# Patient Record
Sex: FEMALE | Race: WHITE | Employment: OTHER | URBAN - METROPOLITAN AREA
[De-identification: names, ages, dates, MRNs, and addresses within clinical notes are randomized per-mention and may not be internally consistent; named-entity substitution may affect disease eponyms.]

---

## 2017-03-29 ENCOUNTER — RECORDS - HEALTHEAST (OUTPATIENT)
Dept: LAB | Facility: CLINIC | Age: 72
End: 2017-03-29

## 2017-03-30 LAB
CHOLEST SERPL-MCNC: 98 MG/DL
FASTING STATUS PATIENT QL REPORTED: ABNORMAL
HBA1C MFR BLD: 6.2 % (ref 4.2–6.1)
HDLC SERPL-MCNC: 30 MG/DL
LDLC SERPL CALC-MCNC: 50 MG/DL
TRIGL SERPL-MCNC: 91 MG/DL

## 2017-04-19 DIAGNOSIS — I25.10 CAD (CORONARY ARTERY DISEASE): ICD-10-CM

## 2017-04-19 DIAGNOSIS — E78.5 HYPERLIPIDEMIA LDL GOAL <70: ICD-10-CM

## 2017-04-19 DIAGNOSIS — E78.2 MIXED HYPERLIPIDEMIA: Primary | ICD-10-CM

## 2017-04-19 RX ORDER — ATORVASTATIN CALCIUM 40 MG/1
40 TABLET, FILM COATED ORAL DAILY
Qty: 90 TABLET | Refills: 1 | Status: SHIPPED | OUTPATIENT
Start: 2017-04-19 | End: 2018-04-09

## 2017-10-11 ENCOUNTER — TRANSFERRED RECORDS (OUTPATIENT)
Dept: HEALTH INFORMATION MANAGEMENT | Facility: CLINIC | Age: 72
End: 2017-10-11

## 2017-10-11 LAB
ALBUMIN SERPL-MCNC: 3.7 G/DL (ref 3.4–5)
ALP SERPL-CCNC: 47 U/L (ref 0–116)
ALT SERPL-CCNC: 23 U/L (ref 0–78)
AST SERPL-CCNC: 21 U/L (ref 0–37)
BILIRUB SERPL-MCNC: 0.31 MG/DL (ref 0.2–1)
BUN SERPL-MCNC: 15 MG/DL (ref 7–18)
CALCIUM SERPL-MCNC: 9.1 MG/DL (ref 8.5–10.1)
CHLORIDE SERPLBLD-SCNC: 106 MMOL/L (ref 98–107)
CHOL/HDL RATIO - HISTORICAL: 3
CHOLEST SERPL-MCNC: 146 MG/DL (ref 0–200)
CREAT SERPL-MCNC: 0.92 MG/DL (ref 0.6–1.3)
GLOBULIN: 3.4 G/DL (ref 2–4)
GLUCOSE SERPL-MCNC: 118 MG/DL (ref 70–99)
HDLC SERPL-MCNC: 49 MG/DL (ref 40–96)
LDLC SERPL CALC-MCNC: 79.8 MG/DL (ref 0–130)
POTASSIUM SERPL-SCNC: 3.8 MMOL/L (ref 3.5–5.1)
SODIUM SERPL-SCNC: 143 MMOL/L (ref 136–145)
TOTAL PROTEIN - QUEST: 7.1 (ref 6.4–8.2)
TRIGL SERPL-MCNC: 86 MG/DL (ref 30–200)
VLDL CHOLESTEROL: 17.2 MG/DL (ref 5–40)

## 2017-11-22 ENCOUNTER — TRANSFERRED RECORDS (OUTPATIENT)
Dept: HEALTH INFORMATION MANAGEMENT | Facility: CLINIC | Age: 72
End: 2017-11-22

## 2018-01-12 ENCOUNTER — DOCUMENTATION ONLY (OUTPATIENT)
Dept: CARDIOLOGY | Facility: CLINIC | Age: 73
End: 2018-01-12

## 2018-04-09 ENCOUNTER — OFFICE VISIT (OUTPATIENT)
Dept: CARDIOLOGY | Facility: CLINIC | Age: 73
End: 2018-04-09
Payer: COMMERCIAL

## 2018-04-09 VITALS
HEART RATE: 66 BPM | SYSTOLIC BLOOD PRESSURE: 110 MMHG | BODY MASS INDEX: 20.66 KG/M2 | WEIGHT: 124 LBS | OXYGEN SATURATION: 97 % | DIASTOLIC BLOOD PRESSURE: 60 MMHG | HEIGHT: 65 IN

## 2018-04-09 DIAGNOSIS — I10 BENIGN ESSENTIAL HYPERTENSION: ICD-10-CM

## 2018-04-09 DIAGNOSIS — Z98.61 S/P PTCA (PERCUTANEOUS TRANSLUMINAL CORONARY ANGIOPLASTY): ICD-10-CM

## 2018-04-09 DIAGNOSIS — I25.10 CORONARY ARTERY DISEASE INVOLVING NATIVE CORONARY ARTERY OF NATIVE HEART WITHOUT ANGINA PECTORIS: Primary | ICD-10-CM

## 2018-04-09 DIAGNOSIS — E78.2 MIXED HYPERLIPIDEMIA: ICD-10-CM

## 2018-04-09 PROCEDURE — 99214 OFFICE O/P EST MOD 30 MIN: CPT | Performed by: INTERNAL MEDICINE

## 2018-04-09 RX ORDER — LOSARTAN POTASSIUM 50 MG/1
50 TABLET ORAL DAILY
COMMUNITY
End: 2018-06-17

## 2018-04-09 RX ORDER — PROPRANOLOL HYDROCHLORIDE 20 MG/1
20 TABLET ORAL 3 TIMES DAILY
COMMUNITY
End: 2018-06-17

## 2018-04-09 RX ORDER — ARIPIPRAZOLE 5 MG/1
5 TABLET ORAL DAILY
COMMUNITY

## 2018-04-09 NOTE — PROGRESS NOTES
HPI and Plan:   See dictation:[id number]    Orders Placed This Encounter   Procedures     Follow-Up with Cardiologist       Orders Placed This Encounter   Medications     ARIPiprazole (ABILIFY) 5 MG tablet     Sig: Take 5 mg by mouth daily     CYANOCOBALAMIN PO     losartan (COZAAR) 50 MG tablet     Sig: Take 50 mg by mouth daily     MIRTAZAPINE PO     Sig: Take 15 mg by mouth At Bedtime     NYSTATIN EX     POLYETHYLENE GLYCOL 3350 PO     propranolol (INDERAL) 20 MG tablet     Sig: Take 20 mg by mouth 3 times daily       Medications Discontinued During This Encounter   Medication Reason     losartan-hydrochlorothiazide (HYZAAR) 100-25 MG per tablet Dose adjustment     atorvastatin (LIPITOR) 40 MG tablet Therapy completed     carboxymethylcellulose (REFRESH PLUS) 0.5 % SOLN Therapy completed     cholecalciferol 2000 UNITS tablet Therapy completed     clonazePAM (KLONOPIN) 0.25 MG TABS Therapy completed     cyclobenzaprine (FLEXERIL) 5 MG tablet Therapy completed     fenofibrate (TRICOR) 145 MG tablet Therapy completed     metoprolol (TOPROL-XL) 50 MG 24 hr tablet Therapy completed     QUEtiapine Fumarate (SEROQUEL PO) Therapy completed     QUEtiapine Fumarate (SEROQUEL PO) Therapy completed     clopidogrel (PLAVIX) 75 MG tablet          Encounter Diagnoses   Name Primary?     Coronary artery disease involving native coronary artery of native heart without angina pectoris Yes     S/P PTCA (percutaneous transluminal coronary angioplasty)      Benign essential hypertension      Mixed hyperlipidemia        CURRENT MEDICATIONS:  Current Outpatient Prescriptions   Medication Sig Dispense Refill     ARIPiprazole (ABILIFY) 5 MG tablet Take 5 mg by mouth daily       CYANOCOBALAMIN PO        losartan (COZAAR) 50 MG tablet Take 50 mg by mouth daily       MIRTAZAPINE PO Take 15 mg by mouth At Bedtime       NYSTATIN EX        POLYETHYLENE GLYCOL 3350 PO        propranolol (INDERAL) 20 MG tablet Take 20 mg by mouth 3 times daily        amLODIPine (NORVASC) 5 MG tablet Take 2 tablets (10 mg) by mouth every evening 90 tablet 3     loratadine-pseudoePHEDrine (CLARITIN-D 12-HOUR) 5-120 MG per tablet Take 1 tablet by mouth 2 times daily as needed for allergies       GLIPIZIDE XL PO Take 5 mg by mouth every morning        DULoxetine (CYMBALTA) 60 MG capsule Take 60 mg by mouth every evening at 1700       OMEPRAZOLE PO Take 20 mg by mouth every morning        fluticasone (FLONASE) 50 MCG/ACT nasal spray Spray 2 sprays into both nostrils daily       Calcium Carbonate-Vitamin D (CALCIUM + D) 600-200 MG-UNIT per tablet Take 1 tablet by mouth daily        metformin (GLUCOPHAGE) 1000 MG tablet Take 1,000 mg by mouth 2 times daily (with meals).       levothyroxine (SYNTHROID, LEVOTHROID) 88 MCG tablet Take 88 mcg by mouth every morning (before breakfast)        aspirin 325 MG tablet Take 81 mg by mouth daily PT STOPPED       Multiple Vitamin (DAILY MULTIVITAMIN PO) Take 1 tablet by mouth daily At noon         ALLERGIES     Allergies   Allergen Reactions     Sulfa Drugs      Synthroid [Thyroid Hormones]        PAST MEDICAL HISTORY:  Past Medical History:   Diagnosis Date     Coronary artery disease 1/1999    MI, CABG-LIMA to the LAD, saphenous vein graft to the diagonal, saphenous vein graft to obtuse  marginal 1, and a separate saphenous vein graft to obtuse marginal 3     DM type 2, goal A1C 7-8     quite a long time     HTN (hypertension)      Hypercholesteremia      Hyperlipidemia LDL goal <100      Hypothyroid     takes levothyroxin for quite a few years     LOC (loss of consciousness) (H) 1960    briefly- went through [facility] and had stitches to left top of head     Mild major depression (H)      S/P PTCA (percutaneous transluminal coronary angioplasty) 9/2/14    stent first diagonal     Vitamin B 12 deficiency        PAST SURGICAL HISTORY:  Past Surgical History:   Procedure Laterality Date     ANKLE SURGERY       APPENDECTOMY       BYPASS GRAFT  ARTERY CORONARY      MI, CABG-LIMA to the LAD, saphenous vein graft to the diagonal, saphenous vein graft to obtuse  marginal 1, and a separate saphenous vein graft to obtuse marginal 3     CHOLECYSTECTOMY       COLONOSCOPY       EYE SURGERY      cataracts- both eyes and implants     HEART CATH, ANGIOPLASTY  2/20/15     patent blood flow in all vessels including grafts.     ORTHOPEDIC SURGERY      left ankle     SINUS SURGERY         FAMILY HISTORY:  Family History   Problem Relation Age of Onset     DIABETES       Depression       DIABETES       C.A.D.       Depression Mother      in and out of IP, mom killed self when she was 15     Anxiety Disorder Mother      Depression Maternal Grandmother      Suicide Maternal Grandmother 40     attempted suicide,  laterIP- possibly from drinking lye     Depression Sister      Anxiety Disorder Sister      Bipolar Disorder Sister      Substance Abuse Sister      Depression Sister      Anxiety Disorder Sister        SOCIAL HISTORY:  Social History     Social History     Marital status:      Spouse name: N/A     Number of children: N/A     Years of education: N/A     Social History Main Topics     Smoking status: Former Smoker     Packs/day: 1.50     Years: 30.00     Types: Cigarettes     Quit date: 3/4/1992     Smokeless tobacco: Never Used     Alcohol use 0.6 oz/week     1 Standard drinks or equivalent per week      Comment: Rare drink with dinner, now doesn't     Drug use: No     Sexual activity: Not Currently     Partners: Male     Other Topics Concern     Caffeine Concern No     1 - 2 daily     Sleep Concern Yes     takes Seroquil     Special Diet Yes      low fat      Exercise Yes     Wel program 4 days per week     Social History Narrative       Review of Systems:  Skin:  Positive for bruising     Eyes:  Positive for glasses    ENT:  not assessed      Respiratory:  Negative       Cardiovascular:    Positive for;palpitations    Gastroenterology: not assessed   "    Genitourinary:  not assessed      Musculoskeletal:  not assessed      Neurologic:  not assessed      Psychiatric:         Heme/Lymph/Imm:  not assessed      Endocrine:  not assessed        Physical Exam:  Vitals: /60 (BP Location: Right arm, Patient Position: Sitting, Cuff Size: Adult Regular)  Pulse 66  Ht 1.651 m (5' 5\")  Wt 56.2 kg (124 lb)  SpO2 97%  BMI 20.63 kg/m2    Constitutional:  cooperative thin;cachectic;frail      Skin:  warm and dry to the touch          Head:    facial abrasions and bruising      Eyes:  pupils equal and round;sclera white;no xanthalasma;conjunctivae and lids unremarkable        Lymph:      ENT:  no pallor or cyanosis        Neck:  carotid pulses are full and equal bilaterally, JVP normal, no carotid bruit        Respiratory:  healed median sternotomy scar;clear to auscultation;normal symmetry;normal respiratory excursion         Cardiac: regular rhythm;normal S1 and S2;apical impulse not displaced   S4   systolic ejection murmur;grade 1;LLSB;radiation to the RUSB        pulses full and equal                                        GI:  abdomen soft, non-tender, BS normoactive, no mass, no HSM, no bruits        Extremities and Muscular Skeletal:  no edema;no deformities, clubbing, cyanosis, erythema observed              Neurological:    tremor clicks her dentures repeatedly, walks with her walker taking very small steps, affect is blunted, answers to questions one word.    Psych:           CC  Nate Guzman MD  [address]  [place] [address]              "

## 2018-04-09 NOTE — LETTER
4/9/2018      Barbara Manzanares  [facility] [address]  [facility] [address]      RE: Caitlyn Brower       Dear Colleague,    I had the pleasure of seeing Caitlyn Brower in the [facility].    Service Date: 04/09/2018      PRIMARY CARE PHYSICIAN:  Dr. Barbara Manzanares.      HISTORY OF PRESENT ILLNESS:  I again had the pleasure of seeing your patient, Caitlyn Brower, at [facility] for evaluation of coronary artery disease and mixed hyperlipidemia.  The patient is status post 4-vessel coronary artery bypass surgery in 01/1999 after suffering a first diagonal branch artery myocardial infarction.  Her bypasses included left internal mammary artery to the LAD, saphenous vein graft to the diagonal with a skip graft to the first obtuse marginal and a separate saphenous vein graft to obtuse marginal 3.  The patient developed unstable angina on 09/02/2014 and was taken the [facility] for intervention.  She had multivessel disease with the LIMA to the LAD patent.  Saphenous vein graft to the diagonal 1 had a 95% stenosis at its anastomotic site.  There was a jump graft to obtuse marginal 1 that was patent.  Obtuse marginal 3 saphenous vein graft was occluded with contrast staining at the graft anastomosis but no antegrade flow.  This vessel was not intervened upon.  The diagonal branch artery anastomosis graft was then treated with a drug-eluting stent.  The only area of possible ischemia would be the third obtuse marginal branch artery and this was treated medically.  The patient had more chest discomfort in 02/2015 and coronary angiography showed no change in her anatomy.  She has been admitted for chest discomfort in 01/2016 and the dobutamine stress echo was normal.  The patient has Lewy body dementia and for the most part lives in an assisted living home.  She has a history of anxiety and nervousness and has  gone to the [facility] in the past for ECT treatments.  Her weight continues to fall and she has now lost close to 60 pounds in the last 2 years.  Her  notes that she eats with a voracious appetite.  Her last fasting lipid profile in 10/2017 showed total cholesterol 146, HDL 49, LDL 80 and triglycerides 86.  The patient has diabetes mellitus.  She has had 2 falls last week causing abrasions to her face, nose, chin and ecchymoses around both eyes.   These were not observed and it is not clear whether she fell or had a syncopal episode.  Her blood pressure when evaluated on 04/06 was 166/80.      PHYSICAL EXAMINATION:   VITAL SIGNS:  Current blood pressure 110/60, pulse 66 and regular, weight 124 pounds compared to 167 pounds in 11/2016.  BMI is now 21.   CHEST:  Clear to auscultation.   CARDIAC:  Regular rate and rhythm, normal S1 and S2 with an S4 gallop but no S3.  There is a 1/6 systolic ejection murmur left lower sternal border, the right upper sternal border.  No other murmur.  No JVD.  Pulses were all intact without bruits.   ABDOMEN:  Benign.   EXTREMITIES:  Without edema.   HEENT:  Demonstrates ecchymoses around her eyes and abrasions on her face.      ASSESSMENT:   1.  Caitlyn Brower is a 72-year-old female with known coronary artery disease status post previous diagonal branch artery myocardial infarction and 4-vessel bypass surgery in 1999.  In 09/2014 she developed unstable angina and the anastomosis of the saphenous vein graft to diagonal 1 was stented with excellent results.  Dobutamine stress echocardiogram was normal in 01/2016.  I have taken the liberty of discontinuing her clopidogrel given her recent falls.  She will continue on aspirin 81 mg per day.   2.  Mixed hyperlipidemia currently under reasonably good control.  The patient was previously on fenofibrate and a statin.  She does not appear to be on either of those at this time.  It is unclear to me why these were discontinued.  I would  certainly like her to remain on the previous statin if possible.   3.  Her blood pressure is quite labile.  It is unclear how well it is controlled.  I certainly do not want her falling due to hypotension.  With her recent weight loss it is possible she is overmedicated.  I would ask Dr. Manzanares to follow her blood pressure and if need be reduce or stop her amlodipine.  Hydrochlorothiazide and clopidogrel have been discontinued.     It is my pleasure to assist in the care of Caitlyn Brower.  All her questions were answered to her satisfaction.    I will see this patient again in 6 months or earlier on a p.r.n. basis.  All her 's questions were answered to his satisfaction.      Bruno Gibson MD       cc:   [place] [address]         BRUNO GIBSON MD, [facility]             D: 2018   T: 2018   MT: RACHEL      Name:     CAITLYN BROWER   MRN:      [id number]        Account:      [id number]   :      1945           Service Date: 2018      Document: [id number]           Outpatient Encounter Prescriptions as of 2018   Medication Sig Dispense Refill     ARIPiprazole (ABILIFY) 5 MG tablet Take 5 mg by mouth daily       CYANOCOBALAMIN PO        losartan (COZAAR) 50 MG tablet Take 50 mg by mouth daily       MIRTAZAPINE PO Take 15 mg by mouth At Bedtime       NYSTATIN EX        POLYETHYLENE GLYCOL 3350 PO        propranolol (INDERAL) 20 MG tablet Take 20 mg by mouth 3 times daily       amLODIPine (NORVASC) 5 MG tablet Take 2 tablets (10 mg) by mouth every evening 90 tablet 3     loratadine-pseudoePHEDrine (CLARITIN-D 12-HOUR) 5-120 MG per tablet Take 1 tablet by mouth 2 times daily as needed for allergies       GLIPIZIDE XL PO Take 5 mg by mouth every morning        DULoxetine (CYMBALTA) 60 MG capsule Take 60 mg by mouth every evening at 1700       OMEPRAZOLE PO Take 20 mg by mouth every morning        fluticasone  (FLONASE) 50 MCG/ACT nasal spray Spray 2 sprays into both nostrils daily       Calcium Carbonate-Vitamin D (CALCIUM + D) 600-200 MG-UNIT per tablet Take 1 tablet by mouth daily        metformin (GLUCOPHAGE) 1000 MG tablet Take 1,000 mg by mouth 2 times daily (with meals).       levothyroxine (SYNTHROID, LEVOTHROID) 88 MCG tablet Take 88 mcg by mouth every morning (before breakfast)        aspirin 325 MG tablet Take 81 mg by mouth daily PT STOPPED       Multiple Vitamin (DAILY MULTIVITAMIN PO) Take 1 tablet by mouth daily At noon       [DISCONTINUED] atorvastatin (LIPITOR) 40 MG tablet Take 1 tablet (40 mg) by mouth daily 90 tablet 1     [DISCONTINUED] clopidogrel (PLAVIX) 75 MG tablet Take 1 tablet (75 mg) by mouth daily 90 tablet 3     [DISCONTINUED] clonazePAM (KLONOPIN) 0.25 MG TABS Take 0.25 mg by mouth 2 times daily every morning & at bedtime       [DISCONTINUED] cyclobenzaprine (FLEXERIL) 5 MG tablet Take 5 mg by mouth nightly as needed for muscle spasms       [DISCONTINUED] QUEtiapine Fumarate (SEROQUEL PO) Take 200 mg by mouth At Bedtime       [DISCONTINUED] carboxymethylcellulose (REFRESH PLUS) 0.5 % SOLN Place 1 drop into both eyes 2 times daily as needed for dry eyes       [DISCONTINUED] losartan-hydrochlorothiazide (HYZAAR) 100-25 MG per tablet Take 1 tablet by mouth daily 30 tablet 1     [DISCONTINUED] QUEtiapine Fumarate (SEROQUEL PO) Take 50 mg by mouth 2 times daily In the morning and at noon. See other bedtime dose       [DISCONTINUED] cholecalciferol 2000 UNITS tablet Take 2,000 Units by mouth daily 30 tablet 0     [DISCONTINUED] metoprolol (TOPROL-XL) 50 MG 24 hr tablet Take 50 mg by mouth daily.       [DISCONTINUED] fenofibrate (TRICOR) 145 MG tablet Take 145 mg by mouth daily.       No facility-administered encounter medications on file as of 4/9/2018.        Again, thank you for allowing me to participate in the care of your patient.      Sincerely,    Nate Guzman MD     [facility]  [facility]

## 2018-04-09 NOTE — LETTER
4/9/2018    [facility] [address]  [facility] [address]    RE: Caitlyn Brower       Dear Colleague,    I had the pleasure of seeing Caitlyn Brower in the [facility].    HPI and Plan:   See dictation:[id number]    Orders Placed This Encounter   Procedures     Follow-Up with Cardiologist       Orders Placed This Encounter   Medications     ARIPiprazole (ABILIFY) 5 MG tablet     Sig: Take 5 mg by mouth daily     CYANOCOBALAMIN PO     losartan (COZAAR) 50 MG tablet     Sig: Take 50 mg by mouth daily     MIRTAZAPINE PO     Sig: Take 15 mg by mouth At Bedtime     NYSTATIN EX     POLYETHYLENE GLYCOL 3350 PO     propranolol (INDERAL) 20 MG tablet     Sig: Take 20 mg by mouth 3 times daily       Medications Discontinued During This Encounter   Medication Reason     losartan-hydrochlorothiazide (HYZAAR) 100-25 MG per tablet Dose adjustment     atorvastatin (LIPITOR) 40 MG tablet Therapy completed     carboxymethylcellulose (REFRESH PLUS) 0.5 % SOLN Therapy completed     cholecalciferol 2000 UNITS tablet Therapy completed     clonazePAM (KLONOPIN) 0.25 MG TABS Therapy completed     cyclobenzaprine (FLEXERIL) 5 MG tablet Therapy completed     fenofibrate (TRICOR) 145 MG tablet Therapy completed     metoprolol (TOPROL-XL) 50 MG 24 hr tablet Therapy completed     QUEtiapine Fumarate (SEROQUEL PO) Therapy completed     QUEtiapine Fumarate (SEROQUEL PO) Therapy completed     clopidogrel (PLAVIX) 75 MG tablet          Encounter Diagnoses   Name Primary?     Coronary artery disease involving native coronary artery of native heart without angina pectoris Yes     S/P PTCA (percutaneous transluminal coronary angioplasty)      Benign essential hypertension      Mixed hyperlipidemia        CURRENT MEDICATIONS:  Current Outpatient Prescriptions   Medication Sig Dispense Refill     ARIPiprazole (ABILIFY) 5 MG tablet Take 5 mg by mouth daily       CYANOCOBALAMIN PO         losartan (COZAAR) 50 MG tablet Take 50 mg by mouth daily       MIRTAZAPINE PO Take 15 mg by mouth At Bedtime       NYSTATIN EX        POLYETHYLENE GLYCOL 3350 PO        propranolol (INDERAL) 20 MG tablet Take 20 mg by mouth 3 times daily       amLODIPine (NORVASC) 5 MG tablet Take 2 tablets (10 mg) by mouth every evening 90 tablet 3     loratadine-pseudoePHEDrine (CLARITIN-D 12-HOUR) 5-120 MG per tablet Take 1 tablet by mouth 2 times daily as needed for allergies       GLIPIZIDE XL PO Take 5 mg by mouth every morning        DULoxetine (CYMBALTA) 60 MG capsule Take 60 mg by mouth every evening at 1700       OMEPRAZOLE PO Take 20 mg by mouth every morning        fluticasone (FLONASE) 50 MCG/ACT nasal spray Spray 2 sprays into both nostrils daily       Calcium Carbonate-Vitamin D (CALCIUM + D) 600-200 MG-UNIT per tablet Take 1 tablet by mouth daily        metformin (GLUCOPHAGE) 1000 MG tablet Take 1,000 mg by mouth 2 times daily (with meals).       levothyroxine (SYNTHROID, LEVOTHROID) 88 MCG tablet Take 88 mcg by mouth every morning (before breakfast)        aspirin 325 MG tablet Take 81 mg by mouth daily PT STOPPED       Multiple Vitamin (DAILY MULTIVITAMIN PO) Take 1 tablet by mouth daily At noon         ALLERGIES     Allergies   Allergen Reactions     Sulfa Drugs      Synthroid [Thyroid Hormones]        PAST MEDICAL HISTORY:  Past Medical History:   Diagnosis Date     Coronary artery disease 1/1999    MI, CABG-LIMA to the LAD, saphenous vein graft to the diagonal, saphenous vein graft to obtuse  marginal 1, and a separate saphenous vein graft to obtuse marginal 3     DM type 2, goal A1C 7-8     quite a long time     HTN (hypertension)      Hypercholesteremia      Hyperlipidemia LDL goal <100      Hypothyroid     takes levothyroxin for quite a few years     LOC (loss of consciousness) (H) 1960    briefly[facility] and had [facility] to left top of head     Mild major depression (H)      S/P PTCA  (percutaneous transluminal coronary angioplasty) 14    stent first diagonal     Vitamin B 12 deficiency        PAST SURGICAL HISTORY:  Past Surgical History:   Procedure Laterality Date     ANKLE SURGERY       APPENDECTOMY       BYPASS GRAFT ARTERY CORONARY      MI, CABG-LIMA to the LAD, saphenous vein graft to the diagonal, saphenous vein graft to obtuse  marginal 1, and a separate saphenous vein graft to obtuse marginal 3     CHOLECYSTECTOMY       COLONOSCOPY       EYE SURGERY      cataracts- both eyes and implants     HEART CATH, ANGIOPLASTY  2/20/15     patent blood flow in all vessels including grafts.     ORTHOPEDIC SURGERY      left ankle     SINUS SURGERY         FAMILY HISTORY:  Family History   Problem Relation Age of Onset     DIABETES       Depression       DIABETES       C.A.D.       Depression Mother      in and out of IP, mom killed self when she was 16     Anxiety Disorder Mother      Depression Maternal Grandmother      Suicide Maternal Grandmother 40     attempted suicide,  laterIP- possibly from drinking lye     Depression Sister      Anxiety Disorder Sister      Bipolar Disorder Sister      Substance Abuse Sister      Depression Sister      Anxiety Disorder Sister        SOCIAL HISTORY:  Social History     Social History     Marital status:      Spouse name: N/A     Number of children: N/A     Years of education: N/A     Social History Main Topics     Smoking status: Former Smoker     Packs/day: 1.50     Years: 30.00     Types: Cigarettes     Quit date: 3/4/1992     Smokeless tobacco: Never Used     Alcohol use 0.6 oz/week     1 Standard drinks or equivalent per week      Comment: Rare drink with dinner, now doesn't     Drug use: No     Sexual activity: Not Currently     Partners: Male     Other Topics Concern     Caffeine Concern No     1 - 2 daily     Sleep Concern Yes     takes Seroquil     Special Diet Yes      low fat      Exercise Yes     Wel program 4 days per week  "    Social History Narrative       Review of Systems:  Skin:  Positive for bruising     Eyes:  Positive for glasses    ENT:  not assessed      Respiratory:  Negative       Cardiovascular:    Positive for;palpitations    Gastroenterology: not assessed      Genitourinary:  not assessed      Musculoskeletal:  not assessed      Neurologic:  not assessed      Psychiatric:         Heme/Lymph/Imm:  not assessed      Endocrine:  not assessed        Physical Exam:  Vitals: /60 (BP Location: Right arm, Patient Position: Sitting, Cuff Size: Adult Regular)  Pulse 66  Ht 1.651 m (5' 5\")  Wt 56.2 kg (124 lb)  SpO2 97%  BMI 20.63 kg/m2    Constitutional:  cooperative thin;cachectic;frail      Skin:  warm and dry to the touch          Head:    facial abrasions and bruising      Eyes:  pupils equal and round;sclera white;no xanthalasma;conjunctivae and lids unremarkable        Lymph:      ENT:  no pallor or cyanosis        Neck:  carotid pulses are full and equal bilaterally, JVP normal, no carotid bruit        Respiratory:  healed median sternotomy scar;clear to auscultation;normal symmetry;normal respiratory excursion         Cardiac: regular rhythm;normal S1 and S2;apical impulse not displaced   S4   systolic ejection murmur;grade 1;LLSB;radiation to the RUSB        pulses full and equal                                        GI:  abdomen soft, non-tender, BS normoactive, no mass, no HSM, no bruits        Extremities and Muscular Skeletal:  no edema;no deformities, clubbing, cyanosis, erythema observed              Neurological:    tremor clicks her dentures repeatedly, walks with her walker taking very small steps, affect is blunted, answers to questions one word.    Psych:           CC  Nate Guzman MD  [address] [place] [id number]                Thank you for allowing me to participate in the care of your patient.      Sincerely,     Nate Guzman MD     [facility]    cc:   Nate Guzman MD  [address]  [place] [address]

## 2018-04-09 NOTE — MR AVS SNAPSHOT
"              After Visit Summary   2018    Caitlyn Brower    MRN: [id number]           Patient Information     Date Of Birth          1945        Visit Information        Provider Department      2018 2:45 PM Nate Guzman MD [facility]         Follow-ups after your visit        Who to contact     If you have questions or need follow up information about today's clinic visit or your schedule please contact [facility] directly at [phone].  Normal or non-critical lab and imaging results will be communicated to you by MyChart, letter or phone within 4 business days after the clinic has received the results. If you do not hear from us within 7 days, please contact the clinic through [employer] or phone. If you have a critical or abnormal lab result, we will notify you by phone as soon as possible.  Submit refill requests through [employer] or call your pharmacy and they will forward the refill request to us. Please allow 3 business days for your refill to be completed.          Additional Information About Your Visit        MyChart Information     [employer] lets you send messages to your doctor, view your test results, renew your prescriptions, schedule appointments and more. To sign up, go to [email or web address] . Click on \"Log in\" on the left side of the screen, which will take you to the Welcome page. Then click on \"Sign up Now\" on the right side of the page.     You will be asked to enter the access code listed below, as well as some personal information. Please follow the directions to create your username and password.     Your access code is: [email or web address]  Expires: 2018  3:20 PM     Your access code will  in 90 days. If you need help or a new code, please call your [place] clinic or [phone].        Care EveryWhere ID     This is your Care EveryWhere ID. This could be used by other " "organizations to access your [place] medical records  [id number]        Your Vitals Were     Pulse Height Pulse Oximetry BMI (Body Mass Index)          66 1.651 m (5' 5\") 97% 20.63 kg/m2         Blood Pressure from Last 3 Encounters:   04/09/18 110/60   12/13/16 156/66   11/15/16 190/90    Weight from Last 3 Encounters:   04/09/18 56.2 kg (124 lb)   12/13/16 75 kg (165 lb 6.4 oz)   11/15/16 75.8 kg (167 lb)              Today, you had the following     No orders found for display         Today's Medication Changes          These changes are accurate as of 4/9/18  3:20 PM.  If you have any questions, ask your nurse or doctor.               Stop taking these medicines if you haven't already. Please contact your care team if you have questions.     clopidogrel 75 MG tablet   Commonly known as:  PLAVIX   Stopped by:  Nate Guzman MD           losartan-hydrochlorothiazide 100-25 MG per tablet   Commonly known as:  HYZAAR   Stopped by:  Nate Guzman MD                    Primary Care Provider Office Phone # Fax #    Barbara Mindy Manzanares [phone]       [facility] [address] [facility] [address]        Equal Access to Services     ETTA LEI AH: Hadii austyn acosta hadasho Soomaali, waaxda luqadaha, qaybta kaalmada adeegyada, waxay anithain hayseeman ashwini ashford. So [facility] [phone].    ATENCIÓN: Si habla español, tiene a ocampo disposición servicios gratuitos de asistencia lingüística. Llame al [phone].    We comply with applicable federal civil rights laws and [place] laws. We do not discriminate on the basis of race, color, national origin, age, disability, sex, sexual orientation, or gender identity.            Thank you!     Thank you for choosing [facility]  for your care. Our goal is always to provide you with excellent care. Hearing back from our patients is one way we can continue to improve our services. Please " take a few minutes to complete the written survey that you may receive in the mail after your visit with us. Thank you!             Your Updated Medication List - Protect others around you: Learn how to safely use, store and throw away your medicines at www.disposemymeds.org.          This list is accurate as of 4/9/18  3:20 PM.  Always use your most recent med list.                   Brand Name Dispense Instructions for use Diagnosis    amLODIPine 5 MG tablet    NORVASC    90 tablet    Take 2 tablets (10 mg) by mouth every evening    Benign essential hypertension       ARIPiprazole 5 MG tablet    ABILIFY     Take 5 mg by mouth daily        aspirin 325 MG tablet      Take 81 mg by mouth daily PT STOPPED        calcium + D 600-200 MG-UNIT Tabs   Generic drug:  calcium carbonate-vitamin D      Take 1 tablet by mouth daily        CYANOCOBALAMIN PO           CYMBALTA 60 MG EC capsule   Generic drug:  DULoxetine      Take 60 mg by mouth every evening at 1700        DAILY MULTIVITAMIN PO      Take 1 tablet by mouth daily At noon        fluticasone 50 MCG/ACT spray    FLONASE     Spray 2 sprays into both nostrils daily        GLIPIZIDE XL PO      Take 5 mg by mouth every morning        levothyroxine 88 MCG tablet    SYNTHROID/LEVOTHROID     Take 88 mcg by mouth every morning (before breakfast)        loratadine-pseudoePHEDrine 5-120 MG per 12 hr tablet    CLARITIN-D 12-hour     Take 1 tablet by mouth 2 times daily as needed for allergies        losartan 50 MG tablet    COZAAR     Take 50 mg by mouth daily        metFORMIN 1000 MG tablet    GLUCOPHAGE     Take 1,000 mg by mouth 2 times daily (with meals).        MIRTAZAPINE PO      Take 15 mg by mouth At Bedtime        NYSTATIN EX           OMEPRAZOLE PO      Take 20 mg by mouth every morning        POLYETHYLENE GLYCOL 3350 PO           propranolol 20 MG tablet    INDERAL     Take 20 mg by mouth 3 times daily

## 2018-04-10 NOTE — PROGRESS NOTES
Service Date: 04/09/2018      PRIMARY CARE PHYSICIAN:  Dr. Barbara Manzanares.      HISTORY OF PRESENT ILLNESS:  I again had the pleasure of seeing your patient, Caitlyn Brower, at [facility] for evaluation of coronary artery disease and mixed hyperlipidemia.  The patient is status post 4-vessel coronary artery bypass surgery in 01/1999 after suffering a first diagonal branch artery myocardial infarction.  Her bypasses included left internal mammary artery to the LAD, saphenous vein graft to the diagonal with a skip graft to the first obtuse marginal and a separate saphenous vein graft to obtuse marginal 3.  The patient developed unstable angina on 09/02/2014 and was taken the [facility] [place] for intervention.  She had multivessel disease with the LIMA to the LAD patent.  Saphenous vein graft to the diagonal 1 had a 95% stenosis at its anastomotic site.  There was a jump graft to obtuse marginal 1 that was patent.  Obtuse marginal 3 saphenous vein graft was occluded with contrast staining at the graft anastomosis but no antegrade flow.  This vessel was not intervened upon.  The diagonal branch artery anastomosis graft was then treated with a drug-eluting stent.  The only area of possible ischemia would be the third obtuse marginal branch artery and this was treated medically.  The patient had more chest discomfort in 02/2015 and coronary angiography showed no change in her anatomy.  She has been admitted for chest discomfort in 01/2016 and the dobutamine stress echo was normal.  The patient has Lewy body dementia and for the most part lives in an assisted living home.  She has a history of anxiety and nervousness and has gone to the [facility] in the past for ECT treatments.  Her weight continues to fall and she has now lost close to 60 pounds in the last 2 years.  Her  notes that she eats with a voracious appetite.  Her last fasting lipid profile in  10/2017 showed total cholesterol 146, HDL 49, LDL 80 and triglycerides 86.  The patient has diabetes mellitus.  She has had 2 falls last week causing abrasions to her face, nose, chin and ecchymoses around both eyes.  These were not observed and it is not clear whether she fell or had a syncopal episode.  Her blood pressure when evaluated on 04/06 was 166/80.      PHYSICAL EXAMINATION:   VITAL SIGNS:  Current blood pressure 110/60, pulse 66 and regular, weight 124 pounds compared to 167 pounds in 11/2016.  BMI is now 21.   CHEST:  Clear to auscultation.   CARDIAC:  Regular rate and rhythm, normal S1 and S2 with an S4 gallop but no S3.  There is a 1/6 systolic ejection murmur left lower sternal border, the right upper sternal border.  No other murmur.  No JVD.  Pulses were all intact without bruits.   ABDOMEN:  Benign.   EXTREMITIES:  Without edema.   HEENT:  Demonstrates ecchymoses around her eyes and abrasions on her face.      ASSESSMENT:   1.  Caitlyn Brower is a 72-year-old female with known coronary artery disease status post previous diagonal branch artery myocardial infarction and 4-vessel bypass surgery in 1999.  In 09/2014 she developed unstable angina and the anastomosis of the saphenous vein graft to diagonal 1 was stented with excellent results.  Dobutamine stress echocardiogram was normal in 01/2016.  I have taken the liberty of discontinuing her clopidogrel given her recent falls.  She will continue on aspirin 81 mg per day.   2.  Mixed hyperlipidemia currently under reasonably good control.  The patient was previously on fenofibrate and a statin.  She does not appear to be on either of those at this time.  It is unclear to me why these were discontinued.  I would certainly like her to remain on the previous statin if possible.   3.  Her blood pressure is quite labile.  It is unclear how well it is controlled.  I certainly do not want her falling due to hypotension.  With her recent weight loss it is  possible she is overmedicated.  I would ask Dr. Manzanares to follow her blood pressure and if need be reduce or stop her amlodipine.  Hydrochlorothiazide and clopidogrel have been discontinued.     It is my pleasure to assist in the care of Caitlyn Brower.  All her questions were answered to her satisfaction.    I will see this patient again in 6 months or earlier on a p.r.n. basis.  All her 's questions were answered to his satisfaction.      Bruno Gibson MD       cc:   [place] [address]         BRUNO GIBSON MD, FACC             D: 2018   T: 2018   MT: RACHEL      Name:     CAITLYN BROWER   MRN:      [id number]        Account:      [id number]   :      1945           Service Date: 2018      Document: [id number]

## 2018-05-19 ENCOUNTER — APPOINTMENT (OUTPATIENT)
Dept: GENERAL RADIOLOGY | Facility: CLINIC | Age: 73
End: 2018-05-19
Attending: NURSE PRACTITIONER
Payer: MEDICARE

## 2018-05-19 ENCOUNTER — HOSPITAL ENCOUNTER (EMERGENCY)
Facility: CLINIC | Age: 73
Discharge: NURSING, REHAB OR LONG-TERM CARE | End: 2018-05-19
Attending: EMERGENCY MEDICINE | Admitting: EMERGENCY MEDICINE
Payer: MEDICARE

## 2018-05-19 VITALS
TEMPERATURE: 98.1 F | RESPIRATION RATE: 16 BRPM | OXYGEN SATURATION: 100 % | HEART RATE: 65 BPM | SYSTOLIC BLOOD PRESSURE: 191 MMHG | DIASTOLIC BLOOD PRESSURE: 117 MMHG
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
## 2018-05-19 PROCEDURE — 99284 EMERGENCY DEPT VISIT MOD MDM: CPT

## 2018-05-19 PROCEDURE — 93005 ELECTROCARDIOGRAM TRACING: CPT

## 2018-05-19 PROCEDURE — 73502 X-RAY EXAM HIP UNI 2-3 VIEWS: CPT

## 2018-05-19 ASSESSMENT — ENCOUNTER SYMPTOMS
FEVER: 0
COUGH: 0
WOUND: 0
SHORTNESS OF BREATH: 0
NECK PAIN: 0
NAUSEA: 0
VOMITING: 0
CHILLS: 0
DIARRHEA: 0

## 2018-05-19 NOTE — ED NOTES
Bed: ED20  Expected date: 5/19/18  Expected time:   Means of arrival: Ambulance  Comments:  [facility]

## 2018-05-19 NOTE — ED AVS SNAPSHOT
[facility] Emergency Department    [address]    [facility] [address]    Phone:  [phone]    Fax:  [phone]                                       Caitlyn Brower   MRN: [id number]    Department:  [facility] Emergency Department   Date of Visit:  5/19/2018           Patient Information     Date Of Birth          1945        Your diagnoses for this visit were:     Fall, initial encounter        You were seen by Niels Barnett MD.      Follow-up Information     Follow up with Barbara Manzanares In 3 days.    Specialty:  Family Practice    Why:  As needed    Contact information:    [facility]  [address]  [facility] [address]  [phone]          Follow up with [facility] Emergency Department.    Specialty:  EMERGENCY MEDICINE    Why:  If symptoms worsen    Contact information:    [address]  [facility] [id number] [phone]        Discharge Instructions       Continue with physical therapy.  Follow-up with PCP Monday for a recheck as needed.  Return to ED with further falls or injuries.        Preventing Falls: Are You At Risk of Falling?     Ask for help to reduce risk of falling in your home.     As you get older, you're not as steady on your feet as you once were. And you may have health problems you didn't have when you were younger. So, it's not surprising that older people are more likely to trip and fall. Falling can be very serious. It can change your overall health and quality of life. That's why it's important to be aware of your own risk of falling.  The dangers of falling  Falls are one of the main causes of injury in people over age 65. An older person who falls may take longer to get better than a younger person. And, after a fall, an older person is more likely to have problems that don't go away. So, preventing falls can help you avoid serious health problems.  Are you at risk of  "falling?  Answer these questions to rate your level of risk.    Are you a woman?    Have you fallen or stumbled in the last year?    Are you over age 65?    Are you ever dizzy or lightheaded with standing?    Do you have a hard time getting in and out of the bathtub or on and off the toilet?    Do you lean on objects to help you get around? Or do you use a cane or walker?    Do you have vision or hearing problems? For example, do you need new glasses or hearing aids?    Do you have 2 or more long-lasting (chronic) medical conditions?    Do you take 3 or more medicines?    Have you felt depressed recently?    Have you had more trouble with your memory in recent months?    Are there hazards in your home that might cause you to fall, such as loose rugs or poor lighting?    Do you have a pet that jumps on you or might trip you?    Have you stopped getting regular exercise?    Do you have diabetes?     Do you have a neurologic disease, such as Parkinson or Alzheimer disease?     Do you drink alcohol?    Do you wear athletic shoes or slippers, or go barefoot at home?  You can help prevent falls  If you answered \"yes\" to any of the above questions, take steps to reduce your risk of a fall. Monitoring health conditions and keeping walkways in your home free of clutter are just two ways. Changing is sometimes easier said than done. But keep in mind that even small changes can make you less likely to fall.  The fear of falling  It's normal to be scared of falling, especially if you've fallen before. But being afraid can actually make you more likely to fall. This is because:    Fear might cause you to become less active. Being less active can lead to a loss of strength and balance.    Fear can lead to isolation from others, depression, or the use of more medicines or alcohol. And all these things make falling even more likely.  To break the cycle, learn more about ways to avoid falling. As you take control, you may find " "yourself feeling less afraid.   Date Last Reviewed: 5/1/2017 2000-2017 [employer]. [address] [place] [address], [place] [address]. All rights reserved. This information is not intended as a substitute for professional medical care. Always follow your healthcare professional's instructions.          Exercises to Prevent Falls  Certain types of exercises may help make you less likely to fall. Try the ones below. Or do other exercises that your healthcare provider suggests. Depending on your health, you may need to start slowly. Don't let that stop you. Even small amounts of exercise can help you. Be sure to talk to your healthcare provider before starting any exercise program.       Improve balance  Many types of exercise can help improve balance. Sam chi and yoga are good examples. Here's another one to try. You can do it anytime and almost anywhere.    Stand next to a counter or solid support.    Push yourself up onto your tiptoes.    Hold for 5 seconds. If you start to lose your balance, hold on to the counter.    Rest and repeat 5 times. Work up to holding for 20 to 30 seconds, if you can. Increase flexibility  Being more flexible makes it easier for you to move around safely. Try exercises like the seated hamstring stretch.    Sit in a chair and put one foot on a stool.    Straighten your leg and reach with both hands down either side of your leg. Reach as far down your leg as you can.    Hold for about 20 seconds.    Go back to the starting position. Then repeat 5 times. Switch legs. Build strength  \"Resistance\" exercises help build strength. You can do them without equipment. Or you can use weights, elastic bands, or special machines. One such exercise is called the biceps curl. You can hold a 1-pound weight or even a can of soup. Do this exercise at least 3 times a week. Strive for every day.    Sit up straight in a chair.    Keep your elbow close to your body and your wrist straight.    Bend your " arm, moving your hand up to your shoulder. Then slowly lower your arm.    Repeat 5 times. Switch to the other arm.   Build your staying power  Aerobic exercises make your heart and lungs stronger so you can keep moving longer. Walking and swimming are two of the best types of exercises you can do. Using a stationary bike is great, too. Find an aerobic exercise that you enjoy. Start slowly and build up. Even 5 minutes is helpful. Aim for a goal of 30 minutes, at least 3 times a week. You don't have to do 30 minutes in 1 session. Break it up and walk a little throughout the day.  More helpful tips    Start easy. Slowly work up to doing more.    Talk with your healthcare provider about the best exercises for you.    Call senior centers or health clubs about exercise programs.    If needed, have a family member watch you walk every so often to check your stability.    Exercise with a friend. Choose an activity you both enjoy.    Consider jessica chi or yoga to strengthen your balance.    Try exercises that you can do anytime, anywhere. Here are 2 examples. Have someone with you when you first try these:  ? Practice walking by placing 1 foot right in front of the other.  ? Stand up and sit down 10 times. Repeat this throughout the day.   Date Last Reviewed: 5/1/2017 2000-2017 [employer]. [address] [place] [address], [place] [address]. All rights reserved. This information is not intended as a substitute for professional medical care. Always follow your healthcare professional's instructions.          24 Hour Appointment Hotline       To make an appointment at any [facility], call [phone] ([phone]). If you don't have a family doctor or clinic, we will help you find one. [facility] are conveniently located to serve the needs of you and your family.             Review of your medicines      Our records show that you are taking the medicines listed below. If these are incorrect, please call  your family doctor or clinic.        Dose / Directions Last dose taken    amLODIPine 5 MG tablet   Commonly known as:  NORVASC   Dose:  10 mg   Quantity:  90 tablet        Take 2 tablets (10 mg) by mouth every evening   Refills:  3        ARIPiprazole 5 MG tablet   Commonly known as:  ABILIFY   Dose:  5 mg        Take 5 mg by mouth daily   Refills:  0        aspirin 325 MG tablet   Dose:  81 mg        Take 81 mg by mouth daily PT STOPPED   Refills:  0        calcium + D 600-200 MG-UNIT Tabs   Dose:  1 tablet   Generic drug:  calcium carbonate-vitamin D        Take 1 tablet by mouth daily   Refills:  0        CYANOCOBALAMIN PO        Refills:  0        CYMBALTA 60 MG EC capsule   Dose:  60 mg   Generic drug:  DULoxetine        Take 60 mg by mouth every evening at 1700   Refills:  0        DAILY MULTIVITAMIN PO   Dose:  1 tablet        Take 1 tablet by mouth daily At noon   Refills:  0        fluticasone 50 MCG/ACT spray   Commonly known as:  FLONASE   Dose:  2 spray        Spray 2 sprays into both nostrils daily   Refills:  0        GLIPIZIDE XL PO   Dose:  5 mg        Take 5 mg by mouth every morning   Refills:  0        levothyroxine 88 MCG tablet   Commonly known as:  SYNTHROID/LEVOTHROID   Dose:  88 mcg        Take 88 mcg by mouth every morning (before breakfast)   Refills:  0        loratadine-pseudoePHEDrine 5-120 MG per 12 hr tablet   Commonly known as:  CLARITIN-D 12-hour   Dose:  1 tablet        Take 1 tablet by mouth 2 times daily as needed for allergies   Refills:  0        losartan 50 MG tablet   Commonly known as:  COZAAR   Dose:  50 mg        Take 50 mg by mouth daily   Refills:  0        metFORMIN 1000 MG tablet   Commonly known as:  GLUCOPHAGE   Dose:  1000 mg        Take 1,000 mg by mouth 2 times daily (with meals).   Refills:  0        MIRTAZAPINE PO   Dose:  15 mg        Take 15 mg by mouth At Bedtime   Refills:  0        NYSTATIN EX        Refills:  0        OMEPRAZOLE PO   Dose:  20 mg         Take 20 mg by mouth every morning   Refills:  0        POLYETHYLENE GLYCOL 3350 PO        Refills:  0        propranolol 20 MG tablet   Commonly known as:  INDERAL   Dose:  20 mg        Take 20 mg by mouth 3 times daily   Refills:  0                Procedures and tests performed during your visit     EKG 12 lead    XR Pelvis w Hip Left G/E 2 Views      Orders Needing Specimen Collection     None      Pending Results     Date and Time Order Name Status Description    5/19/2018 1838 EKG 12 lead Preliminary             Pending Culture Results     No orders found from 5/17/2018 to 5/20/2018.            Pending Results Instructions     If you had any lab results that were not finalized at the time of your Discharge, you can call the ED Lab Result RN at [phone]. You will be contacted by this team for any positive Lab results or changes in treatment. The nurses are available 7 days a week from 10A to 6:30P.  You can leave a message 24 hours per day and they will return your call.        Test Results From Your Hospital Stay        5/19/2018  6:45 PM      Narrative     XR PELVIS AND HIP LEFT 2 VIEWS 5/19/2018 6:40 PM    HISTORY: Hip pain after fall.    COMPARISON: None.    FINDINGS: No fracture or malalignment. Mild degenerative change at the  hips. Osseous structures are otherwise within normal limits.        Impression     IMPRESSION: No acute osseous abnormality.    RUCHI GARLAND MD                Clinical Quality Measure: Blood Pressure Screening     Your blood pressure was checked while you were in the emergency department today. The last reading we obtained was  BP: (!) 172/91 . Please read the guidelines below about what these numbers mean and what you should do about them.  If your systolic blood pressure (the top number) is less than 120 and your diastolic blood pressure (the bottom number) is less than 80, then your blood pressure is normal. There is nothing more that you need to do about it.  If your systolic  "blood pressure (the top number) is 120-139 or your diastolic blood pressure (the bottom number) is 80-89, your blood pressure may be higher than it should be. You should have your blood pressure rechecked within a year by a primary care provider.  If your systolic blood pressure (the top number) is 140 or greater or your diastolic blood pressure (the bottom number) is 90 or greater, you may have high blood pressure. High blood pressure is treatable, but if left untreated over time it can put you at risk for heart attack, stroke, or kidney failure. You should have your blood pressure rechecked by a primary care provider within the next 4 weeks.  If your provider in the emergency department today gave you specific instructions to follow-up with your doctor or provider even sooner than that, you should follow that instruction and not wait for up to 4 weeks for your follow-up visit.        Thank you for choosing [facility]       Thank you for choosing [facility] for your care. Our goal is always to provide you with excellent care. Hearing back from our patients is one way we can continue to improve our services. Please take a few minutes to complete the written survey that you may receive in the mail after you visit with us. Thank you!        [employer] Information     [employer] lets you send messages to your doctor, view your test results, renew your prescriptions, schedule appointments and more. To sign up, go to [email or web address] . Click on \"Log in\" on the left side of the screen, which will take you to the Welcome page. Then click on \"Sign up Now\" on the right side of the page.     You will be asked to enter the access code listed below, as well as some personal information. Please follow the directions to create your username and password.     Your access code is: [email or web address]  Expires: 2018  3:20 PM     Your access code will  in 90 days. If you need help or a new code, please call your [facility] clinic or " [phone].        Care EveryWhere ID     This is your Care EveryWhere ID. This could be used by other organizations to access your [facility] medical records  [id number]        Equal Access to Services     ETTA LEI : Cassandra [place], bernadette tucker, qacorettata kajazjoaquin paniagua, armando ashford. So [facility] [phone].    ATENCIÓN: Si habla español, tiene a ocampo disposición servicios gratuitos de asistencia lingüística. Llame al [phone].    We comply with applicable federal civil rights laws and [place] laws. We do not discriminate on the basis of race, color, national origin, age, disability, sex, sexual orientation, or gender identity.            After Visit Summary       This is your record. Keep this with you and show to your community pharmacist(s) and doctor(s) at your next visit.

## 2018-05-19 NOTE — ED TRIAGE NOTES
Per , pt has hx of several falls in the last 6 weeks. Today, pt had a witnessed fall and was able to be helped back into bed by her .  Pt complained of pain in her L hip, no external rotation. Pt was able to ambulate from bed to the EMS stretcher with a walker. However, the  noted that her gait was more unsteady than normal. Pt is currently disoriented x 3, oriented to place. Pt has hx of alzheimers. ABCDs intact.

## 2018-05-19 NOTE — ED PROVIDER NOTES
"  History     Chief Complaint:    Fall    History obtained from patient, , EMS and RN staff.    HPI   Caitlyn Brower is a 72 year old female with advanced alzheimer's who presents via EMS from memory care unit after witnessed fall.  Per nursing home staff, patient was seen trying to sit down on a couch and had not moved far enough to the right and sat on arm of couch.  From this position she then fell down onto carpet onto the left hip.  She did not hit her head and did not lose consciousness.  She complained of pain at the left hip immediately after fall.  She was able to get off the ground with help from staff and ambulate \"with a limp.\"   was called and went to nursing home and as patient was still in pain he called EMS to transport to the emergency department for evaluation.  Here he notes the patient's mental status is at baseline.  She has a walker at home but rarely uses it.  However he reports frequent falls over the last 6 months.  She has PT set up three times a week to help with strength and gait.  Last fall approximately 5-6 weeks ago when patient fell forward landing on her face.  She continues to have bruising but this is improving. Patient denies any further injuries from fall today.     Allergies:  Sulfa Drugs  Synthroid     Medications:    Norvasc  Abilify   Aspirin   Calcium + D  Cyanocobalamin   Cymbalta  Flonase  Glipizide  Levothyroxine  Claritin D   Cozaar  Meformin   Mirtazapine  Multiple Vitamin   Nystatin   Omeprazole  Polyethylene glycol  Inderal     Past Medical History:    Alzheimer's Disease  Coronary artery disease  DM type 2  HTN  Hyperlipidemia  Hypothyroid   Mild major depression   Vitamin B 12 deficiency     Past Surgical History:    Ankle surgery, left  Appendectomy   Cholecystectomy   Bypass graft artery coronary   Eye surgery   Sinus surgery     Family History:    Depression-Mother, Sister  Anxiety-Mother, Sister  Bipolar Disorder-Sister  Substance " Abuse-Sister    Social History:  Marital Status:   Tobacco Use: Former Smoker  Alcohol Use: Yes  PCP: Barbara Manzanares      Review of Systems   Constitutional: Negative for chills and fever.   Respiratory: Negative for cough and shortness of breath.    Cardiovascular: Negative for chest pain.   Gastrointestinal: Negative for diarrhea, nausea and vomiting.   Musculoskeletal: Positive for gait problem. Negative for neck pain.   Skin: Negative for wound.   All other systems reviewed and are negative.      Physical Exam   First Vitals:  BP: (!) 172/91  Pulse: 65  Heart Rate: 65  Temp: 98.1  F (36.7  C)  Resp: 18  SpO2: 97 %      Physical Exam  General: Patient is alert and interactive when I enter the room  Head:  The scalp, face, and head appear normal. Atraumatic.  Eyes:  The pupils are equal, round, and reactive to light    Conjunctivae and sclerae are normal  ENT:    No hemotympanum or signs basilar skull fracture    The oropharynx is normal without erythema.     Uvula is in the midline. Midface stable to palpation.   Neck:  Normal range of motion.  CV:  Regular rate. S1/S2. No murmurs.   Resp:  Lungs are clear without wheezes or rales. No distress. No crepitance.   GI:  Abdomen is soft, no rigidity, guarding, or rebound. No contusion.    No distension. No tenderness to palpation in any quadrant.     MS:  Normal tone. Joints grossly normal without effusions.     No asymmetric leg swelling, calf or thigh tenderness.      Left hip:Skin intact. No bruising. TTP over lateral hip. No pain with ROM of hip.  No pain     with palpation of femur; no pain with ROM or palpation of knee.    PROM performed of all other major joints without pain.    No C/T/L tenderness in midline or laterally, spines cleared     No chest wall tenderness present.    Skin:  No rash or lesions noted. Normal capillary refill noted  Neuro: Alert and oriented to person/place; disoriented to time.  Neck supple. no aphasia/facial    Droop/dysarthria, normal strength at SCM/trapezius/BUE/BLE.            Emergency Department Course   ECG (17:46:04):  Indication: Screening for cardiovascular disease.   Ventricular Rate 65 bpm. MS interval 184. QRS duration 90. QT/QTc 436/453. P-R-T axes 58 85 98.   Interpretation: Normal sinus rhythm. Possible left atrial enlargement. Anterior infarct; age undetermined.  Agree with computer interpretation. Yes   Interpreted by MD Barnett.     Imaging:  Pelvis x-ray with left hip, 2 views:   No acute osseous abnormality.   Report per radiology.   Radiographic findings were communicated with the patient who voiced understanding of the findings.      Emergency Department Course:  Nursing notes and vitals reviewed.   I performed an exam of the patient as documented above.    The patient was sent for a pelvis with hip x-ray while in the emergency department, findings above.    Dr. Barnett evaluated the patient  Findings and plan explained to the patient. Patient discharged home with instructions regarding supportive care, medications, and reasons to return. The importance of close follow-up was reviewed.     Impression & Plan      Medical Decision Making:  Caitlyn Brower is a 72 year old female who presents for evaluation of hip pain after fall.  This was a witnessed fall that was mechanical in nature.  There was no head injury.  X-ray negative for fracture or dislocation.  I was able to fully range hip without significant pain. She was able to ambulate without increased pain.  She was noted to be taking small/shuffling steps.  Per  this is baseline gait for patient.  Her head to toe exam is otherwise negative for acute injury.  She is safe to discharge back to memory care unit.  Staff are available for frequent checks on patient.  Continue with PT.  Acetaminophen for pain. Follow-up with PCP Monday and return to ED with any further injuries, falls, or further concerns.     Diagnosis:    ICD-10-CM    1. Fall,  initial encounter W19.XXXA        Disposition:  discharged to home      Georgia Trujillo  5/19/2018   [facility] EMERGENCY DEPARTMENT       Georgia Trujillo, APRN CNP  05/19/18 2155  Emergency Department Attending Supervision Note  5/20/2018  12:25 AM      I reviewed LIL NP Hx Px Exam and Plan and agree.         Diagnosis    ICD-10-CM    1. Fall, initial encounter W19.XXXA          No att. providers found      Niels Barnett MD  05/20/18 0025

## 2018-05-20 NOTE — DISCHARGE INSTRUCTIONS
Continue with physical therapy.  Follow-up with PCP Monday for a recheck as needed.  Return to ED with further falls or injuries.        Preventing Falls: Are You At Risk of Falling?     Ask for help to reduce risk of falling in your home.     As you get older, you're not as steady on your feet as you once were. And you may have health problems you didn't have when you were younger. So, it's not surprising that older people are more likely to trip and fall. Falling can be very serious. It can change your overall health and quality of life. That's why it's important to be aware of your own risk of falling.  The dangers of falling  Falls are one of the main causes of injury in people over age 65. An older person who falls may take longer to get better than a younger person. And, after a fall, an older person is more likely to have problems that don't go away. So, preventing falls can help you avoid serious health problems.  Are you at risk of falling?  Answer these questions to rate your level of risk.    Are you a woman?    Have you fallen or stumbled in the last year?    Are you over age 65?    Are you ever dizzy or lightheaded with standing?    Do you have a hard time getting in and out of the bathtub or on and off the toilet?    Do you lean on objects to help you get around? Or do you use a cane or walker?    Do you have vision or hearing problems? For example, do you need new glasses or hearing aids?    Do you have 2 or more long-lasting (chronic) medical conditions?    Do you take 3 or more medicines?    Have you felt depressed recently?    Have you had more trouble with your memory in recent months?    Are there hazards in your home that might cause you to fall, such as loose rugs or poor lighting?    Do you have a pet that jumps on you or might trip you?    Have you stopped getting regular exercise?    Do you have diabetes?     Do you have a neurologic disease, such as Parkinson or Alzheimer disease?     Do  "you drink alcohol?    Do you wear athletic shoes or slippers, or go barefoot at home?  You can help prevent falls  If you answered \"yes\" to any of the above questions, take steps to reduce your risk of a fall. Monitoring health conditions and keeping walkways in your home free of clutter are just two ways. Changing is sometimes easier said than done. But keep in mind that even small changes can make you less likely to fall.  The fear of falling  It's normal to be scared of falling, especially if you've fallen before. But being afraid can actually make you more likely to fall. This is because:    Fear might cause you to become less active. Being less active can lead to a loss of strength and balance.    Fear can lead to isolation from others, depression, or the use of more medicines or alcohol. And all these things make falling even more likely.  To break the cycle, learn more about ways to avoid falling. As you take control, you may find yourself feeling less afraid.   Date Last Reviewed: 5/1/2017 2000-2017 [employer]. [address] [place] [address]. All rights reserved. This information is not intended as a substitute for professional medical care. Always follow your healthcare professional's instructions.          Exercises to Prevent Falls  Certain types of exercises may help make you less likely to fall. Try the ones below. Or do other exercises that your healthcare provider suggests. Depending on your health, you may need to start slowly. Don't let that stop you. Even small amounts of exercise can help you. Be sure to talk to your healthcare provider before starting any exercise program.       Improve balance  Many types of exercise can help improve balance. Sam chi and yoga are good examples. Here's another one to try. You can do it anytime and almost anywhere.    Stand next to a counter or solid support.    Push yourself up onto your tiptoes.    Hold for 5 seconds. If you start to " "lose your balance, hold on to the counter.    Rest and repeat 5 times. Work up to holding for 20 to 30 seconds, if you can. Increase flexibility  Being more flexible makes it easier for you to move around safely. Try exercises like the seated hamstring stretch.    Sit in a chair and put one foot on a stool.    Straighten your leg and reach with both hands down either side of your leg. Reach as far down your leg as you can.    Hold for about 20 seconds.    Go back to the starting position. Then repeat 5 times. Switch legs. Build strength  \"Resistance\" exercises help build strength. You can do them without equipment. Or you can use weights, elastic bands, or special machines. One such exercise is called the biceps curl. You can hold a 1-pound weight or even a can of soup. Do this exercise at least 3 times a week. Strive for every day.    Sit up straight in a chair.    Keep your elbow close to your body and your wrist straight.    Bend your arm, moving your hand up to your shoulder. Then slowly lower your arm.    Repeat 5 times. Switch to the other arm.   Build your staying power  Aerobic exercises make your heart and lungs stronger so you can keep moving longer. Walking and swimming are two of the best types of exercises you can do. Using a stationary bike is great, too. Find an aerobic exercise that you enjoy. Start slowly and build up. Even 5 minutes is helpful. Aim for a goal of 30 minutes, at least 3 times a week. You don't have to do 30 minutes in 1 session. Break it up and walk a little throughout the day.  More helpful tips    Start easy. Slowly work up to doing more.    Talk with your healthcare provider about the best exercises for you.    Call senior centers or health clubs about exercise programs.    If needed, have a family member watch you walk every so often to check your stability.    Exercise with a friend. Choose an activity you both enjoy.    Consider jessica chi or yoga to strengthen your " balance.    Try exercises that you can do anytime, anywhere. Here are 2 examples. Have someone with you when you first try these:  ? Practice walking by placing 1 foot right in front of the other.  ? Stand up and sit down 10 times. Repeat this throughout the day.   Date Last Reviewed: 5/1/2017 2000-2017 [employer]. [address] [place] [address], [place] [id number]. All rights reserved. This information is not intended as a substitute for professional medical care. Always follow your healthcare professional's instructions.

## 2018-05-20 NOTE — ED NOTES
Road test performed on pt. Pt not able to use walker sufficiently due to inability to  the walker with both hands. Pt was able to ambulate in short, shuffling steps for a short distance along the  B corridor. Pt did not express pain or dizziness.

## 2018-05-21 LAB — INTERPRETATION ECG - MUSE: NORMAL

## 2018-06-17 ENCOUNTER — APPOINTMENT (OUTPATIENT)
Dept: CT IMAGING | Facility: CLINIC | Age: 73
End: 2018-06-17
Attending: EMERGENCY MEDICINE
Payer: MEDICARE

## 2018-06-17 ENCOUNTER — HOSPITAL ENCOUNTER (OUTPATIENT)
Facility: CLINIC | Age: 73
Setting detail: OBSERVATION
Discharge: NURSING, REHAB OR LONG-TERM CARE | End: 2018-06-18
Attending: EMERGENCY MEDICINE | Admitting: INTERNAL MEDICINE
Payer: MEDICARE
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
## 2018-06-17 DIAGNOSIS — F03.90 DEMENTIA WITHOUT BEHAVIORAL DISTURBANCE, UNSPECIFIED DEMENTIA TYPE: ICD-10-CM

## 2018-06-17 DIAGNOSIS — R53.1 WEAKNESS: ICD-10-CM

## 2018-06-17 DIAGNOSIS — D64.9 ANEMIA, UNSPECIFIED TYPE: ICD-10-CM

## 2018-06-17 DIAGNOSIS — S09.90XA CLOSED HEAD INJURY, INITIAL ENCOUNTER: ICD-10-CM

## 2018-06-17 LAB
ALBUMIN UR-MCNC: NEGATIVE MG/DL
ANION GAP SERPL CALCULATED.3IONS-SCNC: 10 MMOL/L (ref 3–14)
APPEARANCE UR: CLEAR
BASOPHILS # BLD AUTO: 0.1 10E9/L (ref 0–0.2)
BASOPHILS NFR BLD AUTO: 0.9 %
BILIRUB UR QL STRIP: NEGATIVE
BUN SERPL-MCNC: 20 MG/DL (ref 7–30)
CALCIUM SERPL-MCNC: 9 MG/DL (ref 8.5–10.1)
CHLORIDE SERPL-SCNC: 103 MMOL/L (ref 94–109)
CO2 SERPL-SCNC: 28 MMOL/L (ref 20–32)
COLOR UR AUTO: YELLOW
CREAT SERPL-MCNC: 0.72 MG/DL (ref 0.52–1.04)
DIFFERENTIAL METHOD BLD: ABNORMAL
EOSINOPHIL # BLD AUTO: 0.1 10E9/L (ref 0–0.7)
EOSINOPHIL NFR BLD AUTO: 1.1 %
ERYTHROCYTE [DISTWIDTH] IN BLOOD BY AUTOMATED COUNT: 13.2 % (ref 10–15)
GFR SERPL CREATININE-BSD FRML MDRD: 79 ML/MIN/1.7M2
GLUCOSE SERPL-MCNC: 147 MG/DL (ref 70–99)
GLUCOSE UR STRIP-MCNC: NEGATIVE MG/DL
HCT VFR BLD AUTO: 33 % (ref 35–47)
HGB BLD-MCNC: 10.8 G/DL (ref 11.7–15.7)
HGB UR QL STRIP: NEGATIVE
IMM GRANULOCYTES # BLD: 0 10E9/L (ref 0–0.4)
IMM GRANULOCYTES NFR BLD: 0.2 %
INR PPP: 1.04 (ref 0.86–1.14)
KETONES UR STRIP-MCNC: NEGATIVE MG/DL
LEUKOCYTE ESTERASE UR QL STRIP: NEGATIVE
LYMPHOCYTES # BLD AUTO: 1 10E9/L (ref 0.8–5.3)
LYMPHOCYTES NFR BLD AUTO: 17.8 %
MCH RBC QN AUTO: 30.9 PG (ref 26.5–33)
MCHC RBC AUTO-ENTMCNC: 32.7 G/DL (ref 31.5–36.5)
MCV RBC AUTO: 95 FL (ref 78–100)
MONOCYTES # BLD AUTO: 0.5 10E9/L (ref 0–1.3)
MONOCYTES NFR BLD AUTO: 8.6 %
NEUTROPHILS # BLD AUTO: 3.8 10E9/L (ref 1.6–8.3)
NEUTROPHILS NFR BLD AUTO: 71.4 %
NITRATE UR QL: NEGATIVE
NRBC # BLD AUTO: 0 10*3/UL
NRBC BLD AUTO-RTO: 0 /100
PH UR STRIP: 8 PH (ref 5–7)
PLATELET # BLD AUTO: 229 10E9/L (ref 150–450)
POTASSIUM SERPL-SCNC: 3.8 MMOL/L (ref 3.4–5.3)
RBC # BLD AUTO: 3.49 10E12/L (ref 3.8–5.2)
RBC #/AREA URNS AUTO: 1 /HPF (ref 0–2)
SODIUM SERPL-SCNC: 141 MMOL/L (ref 133–144)
SOURCE: ABNORMAL
SP GR UR STRIP: 1.01 (ref 1–1.03)
SQUAMOUS #/AREA URNS AUTO: <1 /HPF (ref 0–1)
TROPONIN I SERPL-MCNC: <0.015 UG/L (ref 0–0.04)
UROBILINOGEN UR STRIP-MCNC: 0 MG/DL (ref 0–2)
WBC # BLD AUTO: 5.3 10E9/L (ref 4–11)
WBC #/AREA URNS AUTO: 3 /HPF (ref 0–5)

## 2018-06-17 PROCEDURE — 96361 HYDRATE IV INFUSION ADD-ON: CPT

## 2018-06-17 PROCEDURE — 72125 CT NECK SPINE W/O DYE: CPT

## 2018-06-17 PROCEDURE — 70450 CT HEAD/BRAIN W/O DYE: CPT

## 2018-06-17 PROCEDURE — 25000132 ZZH RX MED GY IP 250 OP 250 PS 637: Mod: GY | Performed by: EMERGENCY MEDICINE

## 2018-06-17 PROCEDURE — G0378 HOSPITAL OBSERVATION PER HR: HCPCS

## 2018-06-17 PROCEDURE — 85610 PROTHROMBIN TIME: CPT | Performed by: EMERGENCY MEDICINE

## 2018-06-17 PROCEDURE — 85025 COMPLETE CBC W/AUTO DIFF WBC: CPT | Performed by: EMERGENCY MEDICINE

## 2018-06-17 PROCEDURE — 99285 EMERGENCY DEPT VISIT HI MDM: CPT | Mod: 25

## 2018-06-17 PROCEDURE — 25000128 H RX IP 250 OP 636: Performed by: EMERGENCY MEDICINE

## 2018-06-17 PROCEDURE — 80048 BASIC METABOLIC PNL TOTAL CA: CPT | Performed by: EMERGENCY MEDICINE

## 2018-06-17 PROCEDURE — 96360 HYDRATION IV INFUSION INIT: CPT

## 2018-06-17 PROCEDURE — 25000132 ZZH RX MED GY IP 250 OP 250 PS 637: Mod: GY | Performed by: PHYSICIAN ASSISTANT

## 2018-06-17 PROCEDURE — 93005 ELECTROCARDIOGRAM TRACING: CPT

## 2018-06-17 PROCEDURE — A9270 NON-COVERED ITEM OR SERVICE: HCPCS | Mod: GY | Performed by: PHYSICIAN ASSISTANT

## 2018-06-17 PROCEDURE — 84484 ASSAY OF TROPONIN QUANT: CPT | Performed by: EMERGENCY MEDICINE

## 2018-06-17 PROCEDURE — A9270 NON-COVERED ITEM OR SERVICE: HCPCS | Mod: GY | Performed by: EMERGENCY MEDICINE

## 2018-06-17 PROCEDURE — 25000128 H RX IP 250 OP 636: Performed by: PHYSICIAN ASSISTANT

## 2018-06-17 PROCEDURE — 81001 URINALYSIS AUTO W/SCOPE: CPT | Performed by: EMERGENCY MEDICINE

## 2018-06-17 PROCEDURE — 99219 ZZC INITIAL OBSERVATION CARE,LEVL II: CPT | Performed by: PHYSICIAN ASSISTANT

## 2018-06-17 RX ORDER — ONDANSETRON 4 MG/1
4 TABLET, ORALLY DISINTEGRATING ORAL EVERY 6 HOURS PRN
Status: DISCONTINUED | OUTPATIENT
Start: 2018-06-17 | End: 2018-06-18 | Stop reason: HOSPADM

## 2018-06-17 RX ORDER — DULOXETIN HYDROCHLORIDE 30 MG/1
60 CAPSULE, DELAYED RELEASE ORAL EVERY EVENING
Status: DISCONTINUED | OUTPATIENT
Start: 2018-06-17 | End: 2018-06-18 | Stop reason: HOSPADM

## 2018-06-17 RX ORDER — ACETAMINOPHEN 325 MG/1
650 TABLET ORAL EVERY 6 HOURS PRN
COMMUNITY

## 2018-06-17 RX ORDER — CYANOCOBALAMIN 1000 UG/ML
1 INJECTION, SOLUTION INTRAMUSCULAR; SUBCUTANEOUS
COMMUNITY

## 2018-06-17 RX ORDER — MIRTAZAPINE 15 MG/1
15 TABLET, FILM COATED ORAL AT BEDTIME
Status: DISCONTINUED | OUTPATIENT
Start: 2018-06-17 | End: 2018-06-18 | Stop reason: HOSPADM

## 2018-06-17 RX ORDER — AMOXICILLIN 250 MG
2 CAPSULE ORAL 2 TIMES DAILY PRN
Status: DISCONTINUED | OUTPATIENT
Start: 2018-06-17 | End: 2018-06-18 | Stop reason: HOSPADM

## 2018-06-17 RX ORDER — PROPRANOLOL HYDROCHLORIDE 20 MG/1
60 TABLET ORAL DAILY
Status: DISCONTINUED | OUTPATIENT
Start: 2018-06-18 | End: 2018-06-18 | Stop reason: HOSPADM

## 2018-06-17 RX ORDER — NALOXONE HYDROCHLORIDE 0.4 MG/ML
.1-.4 INJECTION, SOLUTION INTRAMUSCULAR; INTRAVENOUS; SUBCUTANEOUS
Status: DISCONTINUED | OUTPATIENT
Start: 2018-06-17 | End: 2018-06-18 | Stop reason: HOSPADM

## 2018-06-17 RX ORDER — GUAIFENESIN/DEXTROMETHORPHAN 100-10MG/5
10 SYRUP ORAL EVERY 6 HOURS PRN
COMMUNITY

## 2018-06-17 RX ORDER — MIRTAZAPINE 15 MG/1
15 TABLET, FILM COATED ORAL AT BEDTIME
COMMUNITY

## 2018-06-17 RX ORDER — AMOXICILLIN 250 MG
1 CAPSULE ORAL 2 TIMES DAILY PRN
Status: DISCONTINUED | OUTPATIENT
Start: 2018-06-17 | End: 2018-06-18 | Stop reason: HOSPADM

## 2018-06-17 RX ORDER — SODIUM CHLORIDE 9 MG/ML
INJECTION, SOLUTION INTRAVENOUS CONTINUOUS
Status: ACTIVE | OUTPATIENT
Start: 2018-06-17 | End: 2018-06-18

## 2018-06-17 RX ORDER — ONDANSETRON 2 MG/ML
4 INJECTION INTRAMUSCULAR; INTRAVENOUS EVERY 6 HOURS PRN
Status: DISCONTINUED | OUTPATIENT
Start: 2018-06-17 | End: 2018-06-18 | Stop reason: HOSPADM

## 2018-06-17 RX ORDER — LIDOCAINE 40 MG/G
CREAM TOPICAL
Status: DISCONTINUED | OUTPATIENT
Start: 2018-06-17 | End: 2018-06-17

## 2018-06-17 RX ORDER — ACETAMINOPHEN 325 MG/1
650 TABLET ORAL EVERY 4 HOURS PRN
Status: DISCONTINUED | OUTPATIENT
Start: 2018-06-17 | End: 2018-06-18 | Stop reason: HOSPADM

## 2018-06-17 RX ORDER — LEVOTHYROXINE SODIUM 75 UG/1
75 TABLET ORAL DAILY
Status: DISCONTINUED | OUTPATIENT
Start: 2018-06-18 | End: 2018-06-18 | Stop reason: HOSPADM

## 2018-06-17 RX ORDER — LEVOTHYROXINE SODIUM 75 UG/1
75 TABLET ORAL DAILY
COMMUNITY

## 2018-06-17 RX ORDER — SODIUM CHLORIDE 9 MG/ML
1000 INJECTION, SOLUTION INTRAVENOUS CONTINUOUS
Status: DISCONTINUED | OUTPATIENT
Start: 2018-06-17 | End: 2018-06-17

## 2018-06-17 RX ORDER — POLYETHYLENE GLYCOL 3350 17 G/17G
17 POWDER, FOR SOLUTION ORAL DAILY PRN
Status: DISCONTINUED | OUTPATIENT
Start: 2018-06-17 | End: 2018-06-18 | Stop reason: HOSPADM

## 2018-06-17 RX ORDER — PROPRANOLOL HYDROCHLORIDE 60 MG/1
1 TABLET ORAL DAILY
COMMUNITY

## 2018-06-17 RX ORDER — NYSTATIN 100000 U/G
CREAM TOPICAL 2 TIMES DAILY
COMMUNITY

## 2018-06-17 RX ORDER — PROPRANOLOL HYDROCHLORIDE 20 MG/1
20 TABLET ORAL ONCE
Status: COMPLETED | OUTPATIENT
Start: 2018-06-17 | End: 2018-06-17

## 2018-06-17 RX ORDER — IBUPROFEN 200 MG
200 TABLET ORAL EVERY 4 HOURS PRN
COMMUNITY

## 2018-06-17 RX ORDER — LOPERAMIDE HCL 2 MG
4 CAPSULE ORAL 2 TIMES DAILY PRN
COMMUNITY

## 2018-06-17 RX ORDER — ACETAMINOPHEN 650 MG/1
650 SUPPOSITORY RECTAL EVERY 4 HOURS PRN
Status: DISCONTINUED | OUTPATIENT
Start: 2018-06-17 | End: 2018-06-18 | Stop reason: HOSPADM

## 2018-06-17 RX ORDER — LIDOCAINE 40 MG/G
CREAM TOPICAL
Status: DISCONTINUED | OUTPATIENT
Start: 2018-06-17 | End: 2018-06-18 | Stop reason: HOSPADM

## 2018-06-17 RX ADMIN — SODIUM CHLORIDE 250 ML: 9 INJECTION, SOLUTION INTRAVENOUS at 11:38

## 2018-06-17 RX ADMIN — MIRTAZAPINE 15 MG: 15 TABLET, FILM COATED ORAL at 20:12

## 2018-06-17 RX ADMIN — DULOXETINE HYDROCHLORIDE 60 MG: 60 CAPSULE, DELAYED RELEASE ORAL at 20:12

## 2018-06-17 RX ADMIN — METFORMIN HYDROCHLORIDE 500 MG: 500 TABLET, FILM COATED ORAL at 20:12

## 2018-06-17 RX ADMIN — SODIUM CHLORIDE: 9 INJECTION, SOLUTION INTRAVENOUS at 20:13

## 2018-06-17 RX ADMIN — SODIUM CHLORIDE 1000 ML: 9 INJECTION, SOLUTION INTRAVENOUS at 12:28

## 2018-06-17 RX ADMIN — PROPRANOLOL HYDROCHLORIDE 20 MG: 20 TABLET ORAL at 14:46

## 2018-06-17 NOTE — ED PROVIDER NOTES
[facility] Emergency Medicine Note:    History     Chief Complaint:  fall, bruising and swelling to forehead      HPI: Caitlyn Brower is a 72 year old female who presents for evaluation of the patient after she was found down.  She has a history of dementia and is unable to provide any additional history.  She denies pain at this time.  She denies chest pain shortness of breath abdominal pain, nausea, vomiting.  According to her  she was at her baseline yesterday.  He reports that she does not eat well and therefore he has been visiting her once a day and feeding her.  He was there yesterday she was acting at baseline and ate the full meal that he fed her.      Allergies:  Allergies   Allergen Reactions     Sulfa Drugs      Synthroid [Levothyroxine]        Medications:      amLODIPine (NORVASC) 5 MG tablet   ARIPiprazole (ABILIFY) 5 MG tablet   aspirin 325 MG tablet   Calcium Carbonate-Vitamin D (CALCIUM + D) 600-200 MG-UNIT per tablet   CYANOCOBALAMIN PO   DULoxetine (CYMBALTA) 60 MG capsule   fluticasone (FLONASE) 50 MCG/ACT nasal spray   GLIPIZIDE XL PO   levothyroxine (SYNTHROID, LEVOTHROID) 88 MCG tablet   loratadine-pseudoePHEDrine (CLARITIN-D 12-HOUR) 5-120 MG per tablet   losartan (COZAAR) 50 MG tablet   metformin (GLUCOPHAGE) 1000 MG tablet   MIRTAZAPINE PO   Multiple Vitamin (DAILY MULTIVITAMIN PO)   NYSTATIN EX   OMEPRAZOLE PO   POLYETHYLENE GLYCOL 3350 PO   propranolol (INDERAL) 20 MG tablet       Problem List:    Patient Active Problem List    Diagnosis Date Noted     Benign essential hypertension 11/15/2016     Priority: Medium     Coronary artery disease involving native coronary artery of native heart without angina pectoris 11/15/2016     Priority: Medium     Mixed hyperlipidemia 11/10/2015     Priority: Medium     Chest pain 02/21/2015     Priority: Medium     Chest pain of uncertain etiology 02/20/2015     Priority: Medium     STEMI (ST elevation myocardial infarction) (H) 02/20/2015      Priority: Medium     S/P CABG (coronary artery bypass graft) 09/08/2014     Priority: Medium     1999 MI, CABG-LIMA to the LAD, saphenous vein graft to the diagonal, saphenous vein graft to obtuse  marginal 1, and a separate saphenous vein graft to obtuse marginal 3       CAD (coronary artery disease) 09/08/2014     Priority: Medium     S/P PTCA (percutaneous transluminal coronary angioplasty) 09/02/2014     Priority: Medium     9/2/14 stent first diagonal       Unstable angina (H) 08/31/2014     Priority: Medium     Delusional thoughts (H) 01/29/2014     Priority: Medium     Anxiety disorder 12/11/2013     Priority: Medium     Psychosis 11/05/2013     Priority: Medium     HTN (hypertension)      Priority: Medium     DM type 2, goal A1C 7-8      Priority: Medium     Mild major depression (H)      Priority: Medium     Hypothyroid      Priority: Medium     Hyperlipidemia LDL goal <100      Priority: Medium     Vitamin B 12 deficiency      Priority: Medium     Hypercholesteremia      Priority: Medium       Past Medical History:    Past Medical History:   Diagnosis Date     Coronary artery disease 1/1999     DM type 2, goal A1C 7-8      HTN (hypertension)      Hypercholesteremia      Hyperlipidemia LDL goal <100      Hypothyroid      LOC (loss of consciousness) (H) 1960     Mild major depression (H)      S/P PTCA (percutaneous transluminal coronary angioplasty) 9/2/14     Vitamin B 12 deficiency        Past Surgical History:    Past Surgical History:   Procedure Laterality Date     ANKLE SURGERY       APPENDECTOMY       BYPASS GRAFT ARTERY CORONARY  1999    MI, CABG-LIMA to the LAD, saphenous vein graft to the diagonal, saphenous vein graft to obtuse  marginal 1, and a separate saphenous vein graft to obtuse marginal 3     CHOLECYSTECTOMY       COLONOSCOPY       EYE SURGERY      cataracts- both eyes and implants     HEART CATH, ANGIOPLASTY  2/20/15     patent blood flow in all vessels including grafts.      ORTHOPEDIC SURGERY      left ankle     SINUS SURGERY         Family History:    Family History   Problem Relation Age of Onset     DIABETES       Depression       DIABETES       C.A.D.       Depression Mother      in and out of IP, mom killed self when she was 15     Anxiety Disorder Mother      Depression Maternal Grandmother      Suicide Maternal Grandmother 40     attempted suicide,  laterIP- possibly from drinking lye     Depression Sister      Anxiety Disorder Sister      Bipolar Disorder Sister      Substance Abuse Sister      Depression Sister      Anxiety Disorder Sister        Social History:  Social History   Substance Use Topics     Smoking status: Former Smoker     Packs/day: 1.50     Years: 30.00     Types: Cigarettes     Quit date: 3/4/1992     Smokeless tobacco: Never Used     Alcohol use 0.6 oz/week     1 Standard drinks or equivalent per week      Comment: Rare drink with dinner, now doesn't       Review of Systems  See HPI, ROS limited secondary to dementia.     Physical Exam   Vital signs:  Patient Vitals for the past 24 hrs:   BP Temp Temp src Pulse Resp SpO2 Weight   18 1049 158/89 98.6  F (37  C) Temporal 74 18 99 % 51.7 kg (113 lb 15.7 oz)       Physical Exam    Nursing note and vitals reviewed.    Constitutional: Pleasant and well groomed.          HENT: large forehead hematoma   Mouth/Throat: Oropharynx is without swelling or erythema. Oral mucosa moist.    Eyes: Conjunctivae are normal. No scleral icterus.    Neck: moving neck about freely. Some posterior discomfort.    Cardiovascular: Normal rate, regular rhythm and intact distal pulses.    Pulmonary/Chest: Effort normal and breath sounds normal.   Abdominal: Soft.  No distension. There is no tenderness.   Musculoskeletal:  No edema, No calf tenderness  Neurological:Alert and answering questions. PERRL. EOROM intact. No facial drooping. Upper and lower extremity strength intact throughout. Coordination normal.   Skin: Skin is  warm and dry.   Psychiatric: Flat affect.         Emergency Department Course   ECG:  ECG taken at 1106, ECG read at 1134  NSR  Septal infarct, age undetermined  Abnormal ECG  No change from last on 05/19/18  Rate 63 bpm. UT interval 208. QRS duration 92. QT/QTc 428/437. P-R-T axes 30 76 99.     Imaging:  CT Head w/o Contrast   Final Result   IMPRESSION:    1. Left frontal scalp soft tissue swelling. No fracture or   intracranial hemorrhage.   2.  Atrophy of the brain.  White matter changes consistent with   sequelae of small vessel ischemic disease. This is unchanged.         ROWAN ALFONSO MD      CT Cervical Spine w/o Contrast   Final Result   IMPRESSION:   1. No evidence of acute trauma.   2. Degenerative changes as described above.      ROWAN ALFONSO MD          Laboratory:  Labs Ordered and Resulted from Time of ED Arrival Up to the Time of Departure from the ED   CBC WITH PLATELETS DIFFERENTIAL - Abnormal; Notable for the following:        Result Value    RBC Count 3.49 (*)     Hemoglobin 10.8 (*)     Hematocrit 33.0 (*)     All other components within normal limits   BASIC METABOLIC PANEL - Abnormal; Notable for the following:     Glucose 147 (*)     All other components within normal limits   ROUTINE UA WITH MICROSCOPIC - Abnormal; Notable for the following:     pH Urine 8.0 (*)     All other components within normal limits   INR   TROPONIN I   PULSE OXIMETRY NURSING   CARDIAC CONTINUOUS MONITORING   PERIPHERAL IV CATHETER       Interventions:  Medications   lidocaine 1 % 1 mL (not administered)   lidocaine (LMX4) kit (not administered)   sodium chloride (PF) 0.9% PF flush 3 mL (not administered)   sodium chloride (PF) 0.9% PF flush 3 mL (not administered)   0.9% sodium chloride BOLUS (0 mLs Intravenous Stopped 6/17/18 1228)     Followed by   sodium chloride 0.9% infusion (1,000 mLs Intravenous New Bag 6/17/18 1228)   propranolol (INDERAL) tablet 20 mg (20 mg Oral Given 6/17/18 1446)       Emergency  Department Course:  I performed the above history and physical examination.   See above for ED evaluation and  Intervention  I explained findings.   Spoke with staff at [facility] who do not feel comfortable taking her back today.   Spoke with Simran Han who is working with Dr. Kohler who has accepted ongoing care of the patient in observation.     Impression & Plan           Medical Decision Making:  Ms Brower was brought to the emergency department for evaluation after she was found down at this morning from a fall.  Differential diagnosis included but was not limited to intracranial cranial hemorrhage, cervical spine injury, occult infection, electrolyte abnormality, less likely arrhythmia.  Aside from the scalp hematoma there are no other signs of trauma.  CT scan of her head and neck did not reveal acute injury.  The remainder of her balance of her evaluation was remarkable only for 1 g drop in her hemoglobin since last month.  Her  describes slow gradual decline over the last year since she has been in this memory care facility, however staff at the facility feel that there has been a more significant change today and do not feel comfortable taking her back tonight.  Simran Valentino that has accepted ongoing care of the patient on the observation unit.  Plan a repeat hemoglobin ongoing evaluation and possible PT evaluation.      Diagnosis:    ICD-10-CM    1. Fall, initial encounter W19.XXXA    2. Closed head injury, initial encounter S09.90XA    3. Anemia, unspecified type D64.9    4. Weakness R53.1    5. Dementia without behavioral disturbance, unspecified dementia type F03.90        Disposition:  Transfer Observation         Halie Nunez MD  06/17/18 0346

## 2018-06-17 NOTE — ED NOTES
[facility]  ED Nurse Handoff Report    Caitlyn Brower is a 72 year old female   ED Chief complaint: fall, bruising and swelling to forehead  . ED Diagnosis:   Final diagnoses:   Fall, initial encounter   Closed head injury, initial encounter   Anemia, unspecified type   Weakness   Dementia without behavioral disturbance, unspecified dementia type     Allergies:   Allergies   Allergen Reactions     Sulfa Drugs      Synthroid [Levothyroxine]        Code Status: DNR / DNI  Activity level - Baseline/Home:  Independent. Activity Level - Current:   Stand with Assist of 2. Lift room needed: No. Bariatric: No   Needed: No   Isolation: No. Infection: Not Applicable.     Vital Signs:   Vitals:    06/17/18 1330 06/17/18 1345 06/17/18 1415 06/17/18 1430   BP: (!) 177/102 (!) 206/161 (!) 184/106 (!) 192/98   Pulse:       Resp:       Temp:       TempSrc:       SpO2:   98% 98%   Weight:           Cardiac Rhythm:  ,      Pain level: 0-10 Pain Scale: 10  Patient confused: Yes. Patient Falls Risk: Yes.   Elimination Status: Has voided   Patient Report - Initial Complaint: Weakness and fall. Focused Assessment: Musculoskeletal - General Mobility: generalized weakness LLE Extremity Movement: active ROM mildly impaired RLE Extremity Movement: active ROM mildly impaired CMS Intact: Yes Musculoskeletal Comment: A&O to self only. Pt does not know her year of birth. Pt is noted to have a hematoma on left frontal head. Pt has some old abrasions on head as well. Pt weak and appears to dance as she tries to walk. Pt has an unsteady gait. Pt appears to have urge incontinence and is impulsive to get to the bathroom before help is available. Pt is cooperative but is constantly concerned about where her  is.   Tests Performed: lab, imaging. Abnormal Results:   Labs Ordered and Resulted from Time of ED Arrival Up to the Time of Departure from the ED   CBC WITH PLATELETS DIFFERENTIAL - Abnormal; Notable for the  following:        Result Value    RBC Count 3.49 (*)     Hemoglobin 10.8 (*)     Hematocrit 33.0 (*)     All other components within normal limits   BASIC METABOLIC PANEL - Abnormal; Notable for the following:     Glucose 147 (*)     All other components within normal limits   ROUTINE UA WITH MICROSCOPIC - Abnormal; Notable for the following:     pH Urine 8.0 (*)     All other components within normal limits   INR   TROPONIN I   PULSE OXIMETRY NURSING   CARDIAC CONTINUOUS MONITORING   PERIPHERAL IV CATHETER     CT Head w/o Contrast   Final Result   IMPRESSION:    1. Left frontal scalp soft tissue swelling. No fracture or   intracranial hemorrhage.   2.  Atrophy of the brain.  White matter changes consistent with   sequelae of small vessel ischemic disease. This is unchanged.         ROWAN ALFONSO MD      CT Cervical Spine w/o Contrast   Final Result   IMPRESSION:   1. No evidence of acute trauma.   2. Degenerative changes as described above.      ROWAN ALFONSO MD        .   Treatments provided: IVF, propranolol  Family Comments: Pt does not eat well unless he feeds her.  OBS brochure/video discussed/provided to patient:  Yes  ED Medications:   Medications   lidocaine 1 % 1 mL (not administered)   lidocaine (LMX4) kit (not administered)   sodium chloride (PF) 0.9% PF flush 3 mL (not administered)   sodium chloride (PF) 0.9% PF flush 3 mL (not administered)   0.9% sodium chloride BOLUS (0 mLs Intravenous Stopped 6/17/18 1228)     Followed by   sodium chloride 0.9% infusion (1,000 mLs Intravenous New Bag 6/17/18 1228)   propranolol (INDERAL) tablet 20 mg (20 mg Oral Given 6/17/18 1446)     Drips infusing:  No  For the majority of the shift, the patient's behavior Green. Interventions performed were NA.     Severe Sepsis OR Septic Shock Diagnosis Present: No      ED Nurse Name/Phone Number: Sanjay Ferreira,   2:55 PM    RECEIVING UNIT ED HANDOFF REVIEW    Above ED Nurse Handoff Report was reviewed: Yes  Reviewed by:  Shireen Marie on June 17, 2018 at 5:29 PM

## 2018-06-17 NOTE — ED TRIAGE NOTES
Memory care unit staff called  today because they found patient on the floor.  Unsure of when she fell.  Patient has a green color bruise on the right cheek.  She also has a large blue/purple bruising and swelling to the left forehead with abrasions to the top of the forehead.   states that patient has been having falls since May, he has taken her to neurology, cardiology, her primary multiple times.  Uses a wheeled walker.  ABCs intact.

## 2018-06-17 NOTE — H&P
[facility]  History and Physical Exam     Caitlyn Brower MRN# [id number]   YOB: 1945 Age: 72 year old      Date of Admission:  6/17/2018    Primary care provider: Barbara Manzanares          Assessment:   Caitlyn Brower is a 72 year old female with a PMH significant for dementia, CAD, DM, HTN, HLP, hypothyroidism and B12 deficiency, who presents with recurrent falls.  Work up in the ED reveals: VSS in ED, intermittent hypertension. BMP unremarkable. CBC shows stable hgb of 10.8 otherwise normal. Troponin undetectable. UA is normal. CT head and CT of the cervical spine are negative for acute process. EKG SR with septal infarct, unchanged from previous. She received 250 mL NS bolus in ED. She also received her home Propranolol in the ED.   Patient will be registered to Observation for further evaluation and symptom management.     1. Recurrent falls - followed by neurology, per ED report,  reported a gradual decline over past 6 months. Hx of dementia and Parkinsonism features. No acute process identified at this time. Lives at an [facility], possibly a memory care unit, staff unwilling to take pt back tonight, unclear if there are other issues. PT consult and SW consult  2. DM - resume home meds, on metformin only. Effort has been made by PCP to reduce number of meds  3. CAD - hx of 4v CABG in 1999 with subsequent stenting to diagonal graft in 2014. Denies chest pain and SOB.  4. Lewy body dementia - likely contributing to #1  5. Major depression - resume home meds  6. Parkinsonism - likely contributing to #1  7. Anemia - appear chronic. Per chart review, 10.8 seems to be near her baseline although she was at 12.9 in April. No obvious evidence of bleeding. Pt isn't able to provide more additional hx. Recheck in AM  8. HTN - resume home meds  9. Hypothyroidism - resume home meds         Plan:     1. [place] to Observation  2. Obtain orthostatics  3. IV hydration with Normal  saline, @, 100 ml/hr  4. Supportive care with anti-emetics, pain meds PRN  5. Regular as tolerated  6. Follow labs  7. PT consult  8. DVT prophylaxis: pt at low risk, encourage ambulation.    9. SW consult                Chief Complaint:   Falls          History of Present Illness:   Caitlyn Brower is a 72 year old female with a PMH significant for dementia, CAD, DM, HTN, HLP, hypothyroidism and B12 deficiency, who presents with recurrent falls. Pt is quite demented, she is able to answer my questions but unclear on how accurate they are. She is alert to self only. Her  is no longer present. Hx obtained from chart and sign out from ED provider, Dr. Nunez. Pt was found down at her [facility]. Staff and  report increasing falls.  stated that she has been gradually declining over the past 6 months or so but continues to eat well if fed and denies focal sx. She has been recently seen by her neurologist and cardiologist. Medications have been adjusted. The pt today denies chest pain, SOB, abd pain, nausea, vomiting, diarrhea or dysuria. Per ED, staff at the [facility] facility states they are unable to take her back, unclear if this meant just for tonight or indefinitely.             Past Medical History:     Past Medical History:   Diagnosis Date     Coronary artery disease 1/1999    MI, CABG-LIMA to the LAD, saphenous vein graft to the diagonal, saphenous vein graft to obtuse  marginal 1, and a separate saphenous vein graft to obtuse marginal 3     DM type 2, goal A1C 7-8     quite a long time     HTN (hypertension)      Hypercholesteremia      Hyperlipidemia LDL goal <100      Hypothyroid     takes levothyroxin for quite a few years     LOC (loss of consciousness) (H) 1960    briefly- went through [facility] and had stitches to left top of head     Mild major depression (H)      S/P PTCA (percutaneous transluminal coronary angioplasty) 9/2/14    stent first diagonal     Vitamin B 12 deficiency                 Past Surgical History:     Past Surgical History:   Procedure Laterality Date     ANKLE SURGERY       APPENDECTOMY       BYPASS GRAFT ARTERY CORONARY      MI, CABG-LIMA to the LAD, saphenous vein graft to the diagonal, saphenous vein graft to obtuse  marginal 1, and a separate saphenous vein graft to obtuse marginal 3     CHOLECYSTECTOMY       COLONOSCOPY       EYE SURGERY      cataracts- both eyes and implants     HEART CATH, ANGIOPLASTY  2/20/15     patent blood flow in all vessels including grafts.     ORTHOPEDIC SURGERY      left ankle     SINUS SURGERY                 Social History:     Social History     Social History     Marital status:      Spouse name: N/A     Number of children: N/A     Years of education: N/A     Occupational History     Not on file.     Social History Main Topics     Smoking status: Former Smoker     Packs/day: 1.50     Years: 30.00     Types: Cigarettes     Quit date: 3/4/1992     Smokeless tobacco: Never Used     Alcohol use 0.6 oz/week     1 Standard drinks or equivalent per week      Comment: Rare drink with dinner, now doesn't     Drug use: No     Sexual activity: Not Currently     Partners: Male     Other Topics Concern     Caffeine Concern No     1 - 2 daily     Sleep Concern Yes     takes Seroquil     Special Diet Yes      low fat      Exercise Yes     Wel program 4 days per week     Social History Narrative               Family History:     Family History   Problem Relation Age of Onset     DIABETES       Depression       DIABETES       C.A.D.       Depression Mother      in and out of IP, mom killed self when she was 15     Anxiety Disorder Mother      Depression Maternal Grandmother      Suicide Maternal Grandmother 40     attempted suicide,  laterIP- possibly from drinking lye     Depression Sister      Anxiety Disorder Sister      Bipolar Disorder Sister      Substance Abuse Sister      Depression Sister      Anxiety Disorder Sister                Allergies:      Allergies   Allergen Reactions     Sulfa Drugs      Synthroid [Levothyroxine]                Medications:     Prior to Admission medications    Medication Sig Last Dose Taking? Auth Provider   ARIPiprazole (ABILIFY) 5 MG tablet Take 5 mg by mouth daily   Reported, Patient   Calcium Carbonate-Vitamin D (CALCIUM + D) 600-200 MG-UNIT per tablet Take 1 tablet by mouth daily    Reported, Patient   DULoxetine (CYMBALTA) 60 MG capsule Take 60 mg by mouth every evening at 1700   Reported, Patient   fluticasone (FLONASE) 50 MCG/ACT nasal spray Spray 2 sprays into both nostrils daily   Reported, Patient   levothyroxine (SYNTHROID, LEVOTHROID) 88 MCG tablet Take 88 mcg by mouth every morning (before breakfast)    Reported, Patient   loratadine-pseudoePHEDrine (CLARITIN-D 12-HOUR) 5-120 MG per tablet Take 1 tablet by mouth 2 times daily as needed for allergies   Unknown, Entered By History   losartan (COZAAR) 50 MG tablet Take 50 mg by mouth daily   Reported, Patient   metformin (GLUCOPHAGE) 1000 MG tablet Take 1,000 mg by mouth 2 times daily (with meals).   Reported, Patient   MIRTAZAPINE PO Take 15 mg by mouth At Bedtime   Reported, Patient   Multiple Vitamin (DAILY MULTIVITAMIN PO) Take 1 tablet by mouth daily At noon   Reported, Patient   NYSTATIN EX    Reported, Patient   OMEPRAZOLE PO Take 20 mg by mouth every morning    Reported, Patient   POLYETHYLENE GLYCOL 3350 PO    Reported, Patient   propranolol (INDERAL) 20 MG tablet Take 20 mg by mouth 3 times daily   Reported, Patient              Review of Systems:   A Comprehensive greater than 10 system review of systems was carried out.  Pertinent positives and negatives are noted above.  Otherwise negative for contributory information.     Constitutional, neuro, ENT, endocrine, pulmonary, cardiac, gastrointestinal, genitourinary, musculoskeletal, integument and psychiatric systems are negative, except as otherwise noted.         Physical Exam:   Blood  pressure (!) 192/98, pulse 61, temperature 98.6  F (37  C), temperature source Temporal, resp. rate 13, weight 51.7 kg (113 lb 15.7 oz), SpO2 98 %.  Orthostatic Vitals: pending    GENERAL:  Comfortable.  PSYCH: pleasant, oriented, No acute distress.  HEENT:  Hematoma and bruising noted to left forehead, normocephalic. Normal conjunctiva, normal hearing, and oropharynx is normal.  NECK:  Supple, no neck vein distention  HEART:  Normal S1, S2 with no murmur, no pericardial rub, gallops or S3 or S4.  LUNGS:  Clear to auscultation, normal Respiratory effort. No wheezing, rales or ronchi.  GI:  Soft, normal bowel sounds. Non-tender, non distended.   EXTREMITIES:  No pedal edema, +2 pulses bilateral and equal.  SKIN:  Dry to touch, No rash, wound or ulcerations.  NEUROLOGIC:  Demented. CN 2-12 intact, BL 5/5 symmetric upper and lower extremity strength, sensation is intact with no focal deficits.              Data:     EKG demonstrates:  Sinus Rhythm, septal infarct, unchanged from previous tracings.      Recent Labs  Lab 06/17/18  1133   WBC 5.3   HGB 10.8*   HCT 33.0*   MCV 95          Recent Labs  Lab 06/17/18  1133      POTASSIUM 3.8   CHLORIDE 103   CO2 28   ANIONGAP 10   *   BUN 20   CR 0.72   GFRESTIMATED 79   GFRESTBLACK >90   NATI 9.0       Recent Labs  Lab 06/17/18  1133   INR 1.04       Recent Labs  Lab 06/17/18  1133   TROPI <0.015       Recent Labs  Lab 06/17/18  1245   COLOR Yellow   APPEARANCE Clear   URINEGLC Negative   URINEBILI Negative   URINEKETONE Negative   SG 1.009   UBLD Negative   URINEPH 8.0*   PROTEIN Negative   NITRITE Negative   LEUKEST Negative   RBCU 1   WBCU 3         Recent Results (from the past 48 hour(s))   CT Head w/o Contrast    Narrative    CT SCAN OF THE HEAD WITHOUT CONTRAST   6/17/2018 12:57 PM     HISTORY: Trauma, forehead swelling.    TECHNIQUE:  Axial images of the head and coronal reformations without  IV contrast material. Radiation dose for this scan was  reduced using  automated exposure control, adjustment of the mA and/or kV according  to patient size, or iterative reconstruction technique.    COMPARISON: 12/12/2013.    FINDINGS:  There is generalized atrophy of the brain.  There is low  attenuation in the white matter of the cerebral hemispheres consistent  with sequelae of small vessel ischemic disease. There is no evidence  of intracranial hemorrhage, mass, acute infarct or anomaly.     The visualized portions of the sinuses and mastoids appear normal.  Left frontal scalp soft tissue swelling is noted with no underlying  fracture.      Impression    IMPRESSION:   1. Left frontal scalp soft tissue swelling. No fracture or  intracranial hemorrhage.  2.  Atrophy of the brain.  White matter changes consistent with  sequelae of small vessel ischemic disease. This is unchanged.      ROWAN ALFONSO MD   CT Cervical Spine w/o Contrast    Narrative    CT CERVICAL SPINE WITHOUT CONTRAST   6/17/2018 1:00 PM     HISTORY: Neck pain after trauma.     TECHNIQUE: Axial images of the cervical spine were obtained without  intravenous contrast. Multiplanar reformations were performed.   Radiation dose for this scan was reduced using automated exposure  control, adjustment of the mA and/or kV according to patient size, or  iterative reconstruction technique.    COMPARISON: None.    FINDINGS: There is no evidence of fracture. there is reversal cervical  lordosis C5-T1.  There is moderate degenerative disc disease at C6-C7  and mild degenerative disc disease at C5-C6 with grade 1  spondylolisthesis. There is no soft tissue swelling. Spinal canal is  unremarkable. Paraspinous soft tissues appear normal.      Impression    IMPRESSION:  1. No evidence of acute trauma.  2. Degenerative changes as described above.    MD Simran LAZCANO PA-C

## 2018-06-17 NOTE — IP AVS SNAPSHOT
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 2mg give IVP. Give each 0.4mg over 15 seconds in emergency situations. For non-emergent situations further dilute in 9mL of NS to facilitate titration of response.    Admin. Amount: 0.1-0.4 mg = 0.25-1 mL Conc: 0.4 mg/mL  Dispense Loc: [facility] OBS  Volume: 1 mL               ondansetron (ZOFRAN-ODT) ODT tab 4 mg  Dose: 4 mg  Freq: EVERY 6 HOURS PRN Route: PO  PRN Reasons: nausea,vomiting  Start: 06/17/18 1747   Admin Instructions: This is Step 1 of nausea and vomiting management.  If nausea not resolved in 15 minutes, go to Step 2 prochlorperazine (COMPAZINE). Do not push through foil backing. Peel back foil and gently remove. Place on tongue immediately. Administration with liquid unnecessary  With dry hands, peel back foil backing and gently remove tablet; do not push oral disintegrating tablet through foil backing; administer immediately on tongue and oral disintegrating tablet dissolves in seconds; then swallow with saliva; liquid not required.    Admin.  Amount: 1 tablet (1 × 4 mg tablet)  Dispense Loc: [facility] MS2B OBS              Or  ondansetron (ZOFRAN) injection 4 mg  Dose: 4 mg  Freq: EVERY 6 HOURS PRN Route: IV  PRN Reasons: nausea,vomiting  Start: 06/17/18 1747   Admin Instructions: This is Step 1 of nausea and vomiting management.  If nausea not resolved in 15 minutes, go to Step 2 prochlorperazine (COMPAZINE).  Irritant. For ordered doses up to 4 mg, give IV Push undiluted over 2-5 minutes.    Admin. Amount: 4 mg = 2 mL Conc: 4 mg/2 mL  Dispense Loc: [facility] MS2B OBS  Infused Over: 2-5 Minutes  Volume: 2 mL               polyethylene glycol (MIRALAX/GLYCOLAX) Packet 17 g  Dose: 17 g  Freq: DAILY PRN Route: PO  PRN Reason: constipation  Start: 06/17/18 1747   Admin Instructions: Give in 8oz of  water, juice, or soda. Hold for loose stools.  This is the second step of a three step constipation treatment.  1 Packet = 17 grams. Mixed prescribed dose in 8 ounces of water. Follow with 8 oz. of water.    Admin. Amount: 17 g  Dispense Loc: [facility] MS2B OBS               potassium chloride (KLOR-CON) Packet 20-40 mEq  Dose: 20-40 mEq  Freq: EVERY 2 HOURS PRN Route: ORAL OR FEED  PRN Reason: potassium supplementation  Start: 06/18/18 0800   Admin Instructions: Use if unable to tolerate tablets.  If Serum K+ 3.0-3.3, dose = 60 mEq po total dose (40 mEq x1 followed in 2 hours by 20 mEq x1). Recheck K+ level 4 hours after dose and the next AM.  If Serum K+ 2.5-2.9, dose = 80 mEq po total dose (40 mEq Q2H x2). Recheck K+ level 4 hours after dose and the next AM.  If Serum K+ less than 2.5, See IV order.  Dissolve packet contents in 4-8 ounces of cold water or juice.    Admin. Amount: 20-40 mEq  Dispense Loc: [facility] MS2B OBS               potassium chloride 10 mEq in 100 mL intermittent infusion with 10 mg lidocaine  Dose: 10 mEq  Freq: EVERY 1 HOUR PRN Route: IV  PRN Reason: potassium supplementation  Start: 06/18/18 0800   Admin Instructions: Infuse via PERIPHERAL LINE.  Use potassium with lidocaine for pain with peripheral administration.  If Serum K+ 3.0-3.3, dose = 10 mEq/hr x4 doses (40 mEq IV total dose). Recheck K+ level 2 hours after dose and the next AM.  If Serum K+ less than 3.0, dose = 10 mEq/hr x6 doses (60 mEq IV total dose). Recheck K+ level 2 hours after dose and the next AM.    Admin. Amount: 10 mEq = 100 mL Conc: 10 mEq/100 mL  Dispense Loc:  Main Pharmacy  Infused Over: 1 Hours  Volume: 100 mL               potassium chloride 10 mEq in 100 mL sterile water intermittent infusion (premix)  Dose: 10 mEq  Freq: EVERY 1 HOUR PRN Route: IV  PRN Reason: potassium supplementation  Start: 06/18/18 0800   Admin Instructions: Infuse via PERIPHERAL LINE or CENTRAL LINE. Use for central line replacement if patient weight less than 65 kg, if patient is on TPN with high potassium content or if unit does not stock 20 mEq bags.   If Serum K+ 3.0-3.3, dose = 10 mEq/hr x4 doses (40 mEq IV total dose). Recheck K+ level 2 hours after dose and the next AM.   If Serum K+ less than 3.0, dose = 10 mEq/hr x6 doses (60 mEq IV total dose). Recheck K+ level 2 hours after dose and the next AM.    Admin. Amount: 10 mEq = 100 mL Conc: 10 mEq/100 mL  Dispense Loc: [facility]  Infused Over: 60 Minutes  Volume: 100 mL               potassium chloride 20 mEq in 50 mL intermittent infusion  Dose: 20 mEq  Freq: EVERY 1 HOUR PRN Route: IV  PRN Reason: potassium supplementation  Start: 06/18/18 0800   Admin Instructions: Infuse via CENTRAL LINE Only. May need EKG if less than 65 kg or on TPN - Max rate is 0.3 mEq/kg/hr for patients not on EKG monitoring.   If Serum K+ 3.0-3.3, dose = 20 mEq/hr x2 doses (40 mEq IV total dose). Recheck K+ level 2 hours after dose and the next AM.  If Serum K+ less than 3.0, dose = 20 mEq/hr x3 doses (60 mEq IV total dose). Recheck K+ level 2 hours after dose and the next AM.    Admin. Amount: 20 mEq = 50 mL Conc: 20 mEq/50 mL  Dispense Loc: [facility]  Pharmacy  Infused Over: 75 Minutes  Volume: 50 mL               potassium chloride SA (K-DUR/KLOR-CON M) CR tablet 20-40 mEq  Dose: 20-40 mEq  Freq: EVERY 2 HOURS PRN Route: PO  PRN Reason: potassium supplementation  Start: 06/18/18 0800   Admin Instructions: Use if able to take PO.   If Serum K+ 3.0-3.3, dose = 60 mEq po total dose (40 mEq x1 followed in 2 hours by 20 mEq x1). Recheck K+ level 4 hours after dose and the next AM.  If Serum K+ 2.5-2.9, dose = 80 mEq po total dose (40 mEq Q2H x2). Recheck K+ level 4 hours after dose and the next AM.  If Serum K+ less than 2.5, See IV order.  DO NOT CRUSH    Admin. Amount: 1-2 tablet (1-2 × 20 mEq tablet)  Last Admin: 06/18/18 1136  Dispense Loc: [facility] MS2B OBS           0930 (40 mEq)-Given       1136 (20 mEq)-Given           propranolol (INDERAL) tablet 60 mg  Dose: 60 mg  Freq: DAILY Route: PO  Start: 06/18/18 0800   Admin. Amount: 3 tablet (3 × 20 mg tablet)  Last Admin: 06/18/18 0738  Dispense Loc: [facility] MS2B OBS           0738 (60 mg)-Given           senna-docusate (SENOKOT-S;PERICOLACE) 8.6-50 MG per tablet 1 tablet  Dose: 1 tablet  Freq: 2 TIMES DAILY PRN Route: PO  PRN Reason: constipation  Start: 06/17/18 1747   Admin Instructions: If no bowel movement in 24 hours, increase to 2 tablets PO.  Hold for loose stools.  This is the first step of a three step constipation treatment.    Admin. Amount: 1 tablet  Dispense Loc: [facility] MS2B OBS              Or  senna-docusate (SENOKOT-S;PERICOLACE) 8.6-50 MG per tablet 2 tablet  Dose: 2 tablet  Freq: 2 TIMES DAILY PRN Route: PO  PRN Reason: constipation  Start: 06/17/18 1747   Admin Instructions: Hold for loose stools.  This is the first step of a three step constipation treatment.    Admin. Amount: 2 tablet  Dispense Loc: [facility] MS2B OBS               sodium chloride (PF) 0.9% PF flush 3 mL  Dose: 3 mL  Freq: EVERY 8 HOURS Route: IK  Start: 06/17/18 1743   Admin Instructions: And Q1H PRN, to lock peripheral IV dormant  "line.    Admin. Amount: 3 mL  Dispense Loc: [facility] Floor Stock  Volume: 4 mL   Current Line: Peripheral IV 18 Right         ()-Not Given        (216)-Not Given       [ ] 1600           sodium chloride (PF) 0.9% PF flush 3 mL  Dose: 3 mL  Freq: EVERY 1 HOUR PRN Route: IK  PRN Reason: line flush  Start: 18   Admin Instructions: for peripheral IV flush post IV meds    Admin. Amount: 3 mL  Dispense Loc: [facility] Floor Stock  Volume: 4 mL                 Rate: 100 mL/hr   Freq: CONTINUOUS Route: IV  Last Dose: Stopped (18 0738)  Start: 18   End: 18 034   Last Admin: 18 0400  Dispense Loc: [facility] Floor Stock  Volume: 50 mL   Current Line: Peripheral IV 18 Right          ( )-New Bag        0347-Med Discontinued  0400 (1,000 mL)-New Bag       0347-Med Discontinued  0738-Stopped          Completed Medications  Medications 06/12/18 06/13/18 06/14/18 06/15/18 06/16/18 06/17/18 06/18/18         Dose: 250 mL  Freq: ONCE Route: IV  Last Dose: Stopped (18 1228)  Start: 18 1118   End: 18 122   Admin. Amount: 250 mL  Last Admin: 18 1138  Dispense Loc: [facility]  Infused Over: 1 Hours  Administrations Remainin  Volume: 300 mL   Current Line: Peripheral IV 18 Right         1138 (250 mL)-New Bag       1228-Stopped              Dose: 20 mg  Freq: ONCE Route: PO  Start: 18 1421   End: 18 1446   Admin. Amount: 1 tablet (1 × 20 mg tablet)  Last Admin: 18 1446  Dispense Loc:  Main Pharmacy  Administrations Remainin          1446 (20 mg)-Given           Discontinued Medications  Medications 06/12/18 06/13/18 06/14/18 06/15/18 06/16/18 06/17/18 06/18/18         Freq: EVERY 1 HOUR PRN Route: Top  PRN Reason: pain  PRN Comment: with VAD insertion or accessing implanted port.  Start: 18 1113   End: 18 2144   Admin Instructions: Do NOT give if patient has a history of allergy to any local anesthetic or any \"kaylen\" product. " "  Apply 30 minutes prior to VAD insertion or port access.  MAX Dose:  2.5 g (  of 5 g tube)    Dispense Loc: [facility] ERA          1747-Med Discontinued          Dose: 1 mL  Freq: EVERY 1 HOUR PRN Route: OTHER  PRN Comment: mild pain with VAD insertion or accessing implanted port  Start: 06/17/18 1113   End: 06/17/18 1747   Admin Instructions: Do NOT give if patient has a history of allergy to any local anesthetic or any \"kaylen\" product. MAX dose 1 mL subcutaneous OR intradermal in divided doses.    Admin. Amount: 1 mL  Dispense Loc: [facility]  Volume: 5 mL          1747-Med Discontinued          Dose: 3 mL  Freq: EVERY 8 HOURS Route: IK  Start: 06/17/18 1115   End: 06/17/18 1747   Admin Instructions: And Q1H PRN, to lock peripheral IV dormant line.    Admin. Amount: 3 mL  Dispense Loc: [facility] Floor Stock  Volume: 10 mL                 1747-Med Discontinued          Dose: 3 mL  Freq: EVERY 1 HOUR PRN Route: IK  PRN Reason: line flush  PRN Comment: for peripheral IV flush post IV meds  Start: 06/17/18 1113   End: 06/17/18 1747   Admin. Amount: 3 mL  Dispense Loc: [facility] Floor Stock  Volume: 10 mL          1747-Med Discontinued          Rate: 125 mL/hr Dose: 1000 mL  Freq: CONTINUOUS Route: IV  Last Dose: Stopped (06/17/18 2000)  Start: 06/17/18 1118   End: 06/17/18 1747   Admin Instructions: Administer after the bolus.    Admin. Amount: 1,000 mL  Last Admin: 06/17/18 1228  Dispense Loc: [facility] ER  Volume: 1,000 mL   Current Line: Peripheral IV 06/17/18 Right         1228 (1,000 mL)-New Bag       1747-Med Discontinued  2000-Stopped         Medications 06/12/18 06/13/18 06/14/18 06/15/18 06/16/18 06/17/18 06/18/18               INTERAGENCY TRANSFER FORM - NOTES (H&P, Discharge Summary, Consults, Procedures, Therapies)   6/17/2018                       [facility] OBSERVATION DEPARTMENT: [phone]               History & Physicals      H&P by Simran Han PA-C at 6/17/2018  3:34 PM     Author:  Guille" Simran Jerez PA-C Service:  Internal Medicine Author Type:  Physician Assistant - [facility]    Filed:  6/17/2018  6:15 PM Date of Service:  6/17/2018  3:34 PM Creation Time:  6/17/2018  3:34 PM    Status:  Cosign Needed :  Simran Han PA-C (Physician Assistant - [facility]I)    Cosign Required:  Yes             [facility] Outpatient / Observation Unit  History and Physical Exam     Caitlyn Brower MRN# [id number]   YOB: 1945 Age: 72 year old      Date of Admission:  6/17/2018    Primary care provider: Barbara Manzanares          Assessment:   Caitlyn Brower is a 72 year old female with a PMH significant for dementia, CAD, DM, HTN, HLP, hypothyroidism and B12 deficiency, who presents with recurrent falls.  Work up in the ED reveals:[AK1.1] VSS in ED, intermittent hypertension. BMP unremarkable. CBC shows stable hgb of 10.8 otherwise normal. Troponin undetectable. UA is normal. CT head and CT of the cervical spine are negative for acute process. EKG SR with septal infarct, unchanged from previous. She received 250 mL NS bolus in ED. She also received her home Propranolol in the ED.[AK1.2]   Patient will be registered to Observation for further evaluation and symptom management.     1. Recurrent falls -[AK1.1] followed by neurology, per ED report,  reported a gradual decline over past 6 months. Hx of dementia and Parkinsonism features. No acute process identified at this time. Lives at an [facility], possibly a memory care unit, staff unwilling to take pt back tonight, unclear if there are other issues. PT consult and SW consult  2. DM - resume home meds, on metformin only. Effort has been made by PCP to reduce number of meds  3. CAD - hx of 4v CABG in 1999 with subsequent stenting to diagonal graft in 2014. Denies chest pain and SOB.  4. Lewy body dementia - likely contributing to #1  5. Major depression - resume home meds  6. Parkinsonism - likely contributing to #1  7. Anemia - appear chronic. Per chart review,  10.8 seems to be near her baseline although she was at 12.9 in April. No obvious evidence of bleeding. Pt isn't able to provide more additional hx. Recheck in AM  8. HTN - resume home meds  9. Hypothyroidism - resume home meds[AK1.2]         Plan:     1. [place] to Observation  2. Obtain orthostatics  3. IV hydration with Normal saline, @, 100 ml/hr  4. Supportive care with anti-emetics, pain meds PRN  5. Regular as tolerated  6. Follow labs  7. PT consult  8. DVT prophylaxis: pt at low risk, encourage ambulation.    9. SW consult                Chief Complaint:[AK1.1]   Falls[AK1.2]          History of Present Illness:   Caitlyn Brower is a 72 year old female with a PMH significant for dementia, CAD, DM, HTN, HLP, hypothyroidism and B12 deficiency, who presents with recurrent falls.[AK1.1] Pt is quite demented, she is able to answer my questions but unclear on how accurate they are. She is alert to self only. Her  is no longer present. Hx obtained from chart and sign out from ED provider, Dr. Nunez. Pt was found down at her [facility]. Staff and  report increasing falls.  stated that she has been gradually declining over the past 6 months or so but continues to eat well if fed and denies focal sx. She has been recently seen by her neurologist and cardiologist. Medications have been adjusted. The pt today denies chest pain, SOB, abd pain, nausea, vomiting, diarrhea or dysuria. Per ED, staff at the [facility]/memory care facility states they are unable to take her back, unclear if this meant just for tonight or indefinitely.[AK1.2]             Past Medical History:     Past Medical History:   Diagnosis Date     Coronary artery disease 1/1999    MI, CABG-LIMA to the LAD, saphenous vein graft to the diagonal, saphenous vein graft to obtuse  marginal 1, and a separate saphenous vein graft to obtuse marginal 3     DM type 2, goal A1C 7-8     quite a long time     HTN (hypertension)      Hypercholesteremia       Hyperlipidemia LDL goal <100      Hypothyroid     takes levothyroxin for quite a few years     LOC (loss of consciousness) (H) 1960    briefly- went through [facility] and had stitches to left top of head     Mild major depression (H)      S/P PTCA (percutaneous transluminal coronary angioplasty) 9/2/14    stent first diagonal     Vitamin B 12 deficiency                Past Surgical History:     Past Surgical History:   Procedure Laterality Date     ANKLE SURGERY       APPENDECTOMY       BYPASS GRAFT ARTERY CORONARY  1999    MI, CABG-LIMA to the LAD, saphenous vein graft to the diagonal, saphenous vein graft to obtuse  marginal 1, and a separate saphenous vein graft to obtuse marginal 3     CHOLECYSTECTOMY       COLONOSCOPY       EYE SURGERY      cataracts- both eyes and implants     HEART CATH, ANGIOPLASTY  2/20/15     patent blood flow in all vessels including grafts.     ORTHOPEDIC SURGERY      left ankle     SINUS SURGERY                 Social History:     Social History     Social History     Marital status:      Spouse name: N/A     Number of children: N/A     Years of education: N/A     Occupational History     Not on file.     Social History Main Topics     Smoking status: Former Smoker     Packs/day: 1.50     Years: 30.00     Types: Cigarettes     Quit date: 3/4/1992     Smokeless tobacco: Never Used     Alcohol use 0.6 oz/week     1 Standard drinks or equivalent per week      Comment: Rare drink with dinner, now doesn't     Drug use: No     Sexual activity: Not Currently     Partners: Male     Other Topics Concern     Caffeine Concern No     1 - 2 daily     Sleep Concern Yes     takes Seroquil     Special Diet Yes      low fat      Exercise Yes     Wel program 4 days per week     Social History Narrative               Family History:     Family History   Problem Relation Age of Onset     DIABETES       Depression       DIABETES       C.A.D.       Depression Mother      in and out of IP, mom killed  self when she was 15     Anxiety Disorder Mother      Depression Maternal Grandmother      Suicide Maternal Grandmother 40     attempted suicide,  laterIP- possibly from drinking lye     Depression Sister      Anxiety Disorder Sister      Bipolar Disorder Sister      Substance Abuse Sister      Depression Sister      Anxiety Disorder Sister               Allergies:      Allergies   Allergen Reactions     Sulfa Drugs      Synthroid [Levothyroxine]                Medications:     Prior to Admission medications    Medication Sig Last Dose Taking? Auth Provider   ARIPiprazole (ABILIFY) 5 MG tablet Take 5 mg by mouth daily   Reported, Patient   Calcium Carbonate-Vitamin D (CALCIUM + D) 600-200 MG-UNIT per tablet Take 1 tablet by mouth daily    Reported, Patient   DULoxetine (CYMBALTA) 60 MG capsule Take 60 mg by mouth every evening at 1700   Reported, Patient   fluticasone (FLONASE) 50 MCG/ACT nasal spray Spray 2 sprays into both nostrils daily   Reported, Patient   levothyroxine (SYNTHROID, LEVOTHROID) 88 MCG tablet Take 88 mcg by mouth every morning (before breakfast)    Reported, Patient   loratadine-pseudoePHEDrine (CLARITIN-D 12-HOUR) 5-120 MG per tablet Take 1 tablet by mouth 2 times daily as needed for allergies   Unknown, Entered By History   losartan (COZAAR) 50 MG tablet Take 50 mg by mouth daily   Reported, Patient   metformin (GLUCOPHAGE) 1000 MG tablet Take 1,000 mg by mouth 2 times daily (with meals).   Reported, Patient   MIRTAZAPINE PO Take 15 mg by mouth At Bedtime   Reported, Patient   Multiple Vitamin (DAILY MULTIVITAMIN PO) Take 1 tablet by mouth daily At noon   Reported, Patient   NYSTATIN EX    Reported, Patient   OMEPRAZOLE PO Take 20 mg by mouth every morning    Reported, Patient   POLYETHYLENE GLYCOL 3350 PO    Reported, Patient   propranolol (INDERAL) 20 MG tablet Take 20 mg by mouth 3 times daily   Reported, Patient              Review of Systems:   A Comprehensive greater than 10 system  review of systems was carried out.  Pertinent positives and negatives are noted above.  Otherwise negative for contributory information.     Constitutional, neuro, ENT, endocrine, pulmonary, cardiac, gastrointestinal, genitourinary, musculoskeletal, integument and psychiatric systems are negative, except as otherwise noted.         Physical Exam:   Blood pressure (!) 192/98, pulse 61, temperature 98.6  F (37  C), temperature source Temporal, resp. rate 13, weight 51.7 kg (113 lb 15.7 oz), SpO2 98 %.  Orthostatic Vitals: pending    GENERAL:  Comfortable.  PSYCH: pleasant, oriented, No acute distress.  HEENT:[AK1.1]  Hematoma and bruising noted to left forehead[AK1.2], normocephalic. Normal conjunctiva, normal hearing, and oropharynx is normal.  NECK:  Supple, no neck vein distention  HEART:  Normal S1, S2 with no murmur, no pericardial rub, gallops or S3 or S4.  LUNGS:  Clear to auscultation, normal Respiratory effort. No wheezing, rales or ronchi.  GI:  Soft, normal bowel sounds. Non-tender, non distended.   EXTREMITIES:  No pedal edema, +2 pulses bilateral and equal.  SKIN:  Dry to touch, No rash, wound or ulcerations.  NEUROLOGIC:[AK1.1]  Demented.[AK1.2] CN 2-12 intact, BL 5/5 symmetric upper and lower extremity strength, sensation is intact with no focal deficits.              Data:     EKG demonstrates:  Sinus Rhythm, septal infarct, unchanged from previous tracings.[AK1.1]      Recent Labs  Lab 06/17/18  1133   WBC 5.3   HGB 10.8*   HCT 33.0*   MCV 95          Recent Labs  Lab 06/17/18  1133      POTASSIUM 3.8   CHLORIDE 103   CO2 28   ANIONGAP 10   *   BUN 20   CR 0.72   GFRESTIMATED 79   GFRESTBLACK >90   NATI 9.0       Recent Labs  Lab 06/17/18  1133   INR 1.04       Recent Labs  Lab 06/17/18  1133   TROPI <0.015       Recent Labs  Lab 06/17/18  1245   COLOR Yellow   APPEARANCE Clear   URINEGLC Negative   URINEBILI Negative   URINEKETONE Negative   SG 1.009   UBLD Negative   URINEPH 8.0*    PROTEIN Negative   NITRITE Negative   LEUKEST Negative   RBCU 1   WBCU 3         Recent Results (from the past 48 hour(s))   CT Head w/o Contrast    Narrative    CT SCAN OF THE HEAD WITHOUT CONTRAST   6/17/2018 12:57 PM     HISTORY: Trauma, forehead swelling.    TECHNIQUE:  Axial images of the head and coronal reformations without  IV contrast material. Radiation dose for this scan was reduced using  automated exposure control, adjustment of the mA and/or kV according  to patient size, or iterative reconstruction technique.    COMPARISON: 12/12/2013.    FINDINGS:  There is generalized atrophy of the brain.  There is low  attenuation in the white matter of the cerebral hemispheres consistent  with sequelae of small vessel ischemic disease. There is no evidence  of intracranial hemorrhage, mass, acute infarct or anomaly.     The visualized portions of the sinuses and mastoids appear normal.  Left frontal scalp soft tissue swelling is noted with no underlying  fracture.      Impression    IMPRESSION:   1. Left frontal scalp soft tissue swelling. No fracture or  intracranial hemorrhage.  2.  Atrophy of the brain.  White matter changes consistent with  sequelae of small vessel ischemic disease. This is unchanged.      ROWAN ALFONSO MD   CT Cervical Spine w/o Contrast    Narrative    CT CERVICAL SPINE WITHOUT CONTRAST   6/17/2018 1:00 PM     HISTORY: Neck pain after trauma.     TECHNIQUE: Axial images of the cervical spine were obtained without  intravenous contrast. Multiplanar reformations were performed.   Radiation dose for this scan was reduced using automated exposure  control, adjustment of the mA and/or kV according to patient size, or  iterative reconstruction technique.    COMPARISON: None.    FINDINGS: There is no evidence of fracture. there is reversal cervical  lordosis C5-T1.  There is moderate degenerative disc disease at C6-C7  and mild degenerative disc disease at C5-C6 with grade 1  spondylolisthesis.  There is no soft tissue swelling. Spinal canal is  unremarkable. Paraspinous soft tissues appear normal.      Impression    IMPRESSION:  1. No evidence of acute trauma.  2. Degenerative changes as described above.    ROWAN ALFONSO MD[AK1.3]       Simran Han PA-C[AK1.1]       Revision History        User Key Date/Time User Provider Type Action    > AK1.2 6/17/2018  6:15 PM Simran Han PA-C Physician Assistant - [facility] Sign     AK1.3 6/17/2018  3:37 PM Simran Han PA-C Physician Assistant - C      AK1.1 6/17/2018  3:34 PM Simran Han PA-C Physician Assistant - MAR[facility]                   Discharge Summaries     No notes of this type exist for this encounter.         Consult Notes      Consults by Terri Garcia RN at 6/18/2018 12:58 PM     Author:  Terri Garcia RN Service:  (none) Author Type:      Filed:  6/18/2018  1:21 PM Date of Service:  6/18/2018 12:58 PM Creation Time:  6/18/2018 12:23 PM    Status:  Addendum :  Terri Garcia RN ()     Consult Orders:    1. Social Work IP Consult [[id number]] ordered by Simran Han PA-C at 06/17/18 1625                Care Transition Initial Assessment - RN    Reason For Consult: care coordination/care conference, discharge planning   Met with: Patient and  at bedside, Misty ELY by [phone] [phone].   DATA[AC1.1]   Active Problems:    Fall[AC1.2]       Cognitive Status: awake and alert.  Primary Care Clinic Name: [facility]   Primary Care MD Name: Dr. Manzanares   Contact information and PCP information verified: Yes  Lives With: facility resident  Living Arrangements: other (see comments) ([facility] on [facility])  Quality Of Family Relationships: supportive, involved  Description of Support System: Supportive, Involved   Who is your support system?:        Insurance concerns: No Insurance issues identified  ASSESSMENT  Patient currently receives the  following services:  Pt lives at [facility] on [facility] in their Memory Care unit. At baseline, she ambulates w/ SBA and 4WW. Both  and staff report that pt's ambulation w/ 4WW is difficult as pt has a left hand contracture and is unable to grasp her walker. The staff at [facility] provides assistance w/ bathing, dressing, toileting and medication management. [facility] is able to provide up to A2 for pt, if needed.     In the past, pt has been open to [facility] for PT/OT, she is not currently open to HC. Misty reports that pt's ability to participate in therapies varies day by day.     Per PT, pt should ambulate w/ CGA at all times and no assistive device for safety. Pt will d/c back to her Memory Care today w/  transporting. Call placed to Misty and LM to call back to update, awaiting response.[AC1.1]     Update 1320: Spoke w/ Misty at [facility], reviewed PT recommendations and plan for pt to return this afternoon. Pt is able to d/c back to her Memory Care unit w/  transporting, orders faxed.[AC1.3]        PLAN  Patient given options and choices for discharge Yes.  Patient/family is agreeable to the plan?  Yes  Patient anticipates discharging to [facility] on [facility] Memory Care .     Patient anticipates needs for home equipment: No  Plan/Disposition: Home     Care  (CTS) will continue to follow as needed.    Terri Garcia RN BSN CTS   [phone]  Care Transitions Team  [facility][AC1.1]             Revision History        User Key Date/Time User Provider Type Action    > AC1.3 6/18/2018  1:21 PM Terri Garcia RN Case Manager Addend     AC1.2 6/18/2018 12:58 PM Terri Garcia RN Case Manager Sign     AC1.1 6/18/2018 12:23 PM Terri Garcia RN Case Manager                      Progress Notes - Physician (Notes for yesterday and today)      ED Provider Notes by Halie Nunez MD at 6/17/2018 10:39 AM     Author:  Enrique  Halie Riggs MD Service:  Emergency Medicine Author Type:  Physician    Filed:  6/17/2018  3:19 PM Date of Service:  6/17/2018 10:39 AM Creation Time:  6/17/2018 11:12 AM    Status:  Signed :  Halie Nunez MD (Physician)         [facility] Emergency Medicine Note:    History     Chief Complaint:[CK1.1]  fall, bruising and swelling to forehead[CK1.2]      HPI: Caitlyn Brower is a 72 year old female who presents for evaluation of[CK1.1] the patient after she was found down.  She has a history of dementia and is unable to provide any additional history.  She denies pain at this time.  She denies chest pain shortness of breath abdominal pain, nausea, vomiting.  According to her  she was at her baseline yesterday.  He reports that she does not eat well and therefore he has been visiting her once a day and feeding her.  He was there yesterday she was acting at baseline and ate the full meal that he fed her.[CK1.3]      Allergies:[CK1.1]  Allergies   Allergen Reactions     Sulfa Drugs      Synthroid [Levothyroxine][CK1.2]        Medications:[CK1.1]      amLODIPine (NORVASC) 5 MG tablet   ARIPiprazole (ABILIFY) 5 MG tablet   aspirin 325 MG tablet   Calcium Carbonate-Vitamin D (CALCIUM + D) 600-200 MG-UNIT per tablet   CYANOCOBALAMIN PO   DULoxetine (CYMBALTA) 60 MG capsule   fluticasone (FLONASE) 50 MCG/ACT nasal spray   GLIPIZIDE XL PO   levothyroxine (SYNTHROID, LEVOTHROID) 88 MCG tablet   loratadine-pseudoePHEDrine (CLARITIN-D 12-HOUR) 5-120 MG per tablet   losartan (COZAAR) 50 MG tablet   metformin (GLUCOPHAGE) 1000 MG tablet   MIRTAZAPINE PO   Multiple Vitamin (DAILY MULTIVITAMIN PO)   NYSTATIN EX   OMEPRAZOLE PO   POLYETHYLENE GLYCOL 3350 PO   propranolol (INDERAL) 20 MG tablet[CK1.2]       Problem List:[CK1.1]    Patient Active Problem List    Diagnosis Date Noted     Benign essential hypertension 11/15/2016     Priority: Medium     Coronary artery disease involving native coronary  artery of native heart without angina pectoris 11/15/2016     Priority: Medium     Mixed hyperlipidemia 11/10/2015     Priority: Medium     Chest pain 02/21/2015     Priority: Medium     Chest pain of uncertain etiology 02/20/2015     Priority: Medium     STEMI (ST elevation myocardial infarction) (H) 02/20/2015     Priority: Medium     S/P CABG (coronary artery bypass graft) 09/08/2014     Priority: Medium     1999 MI, CABG-LIMA to the LAD, saphenous vein graft to the diagonal, saphenous vein graft to obtuse  marginal 1, and a separate saphenous vein graft to obtuse marginal 3       CAD (coronary artery disease) 09/08/2014     Priority: Medium     S/P PTCA (percutaneous transluminal coronary angioplasty) 09/02/2014     Priority: Medium     9/2/14 stent first diagonal       Unstable angina (H) 08/31/2014     Priority: Medium     Delusional thoughts (H) 01/29/2014     Priority: Medium     Anxiety disorder 12/11/2013     Priority: Medium     Psychosis 11/05/2013     Priority: Medium     HTN (hypertension)      Priority: Medium     DM type 2, goal A1C 7-8      Priority: Medium     Mild major depression (H)      Priority: Medium     Hypothyroid      Priority: Medium     Hyperlipidemia LDL goal <100      Priority: Medium     Vitamin B 12 deficiency      Priority: Medium     Hypercholesteremia      Priority: Medium[CK1.2]       Past Medical History:[CK1.1]    Past Medical History:   Diagnosis Date     Coronary artery disease 1/1999     DM type 2, goal A1C 7-8      HTN (hypertension)      Hypercholesteremia      Hyperlipidemia LDL goal <100      Hypothyroid      LOC (loss of consciousness) (H) 1960     Mild major depression (H)      S/P PTCA (percutaneous transluminal coronary angioplasty) 9/2/14     Vitamin B 12 deficiency[CK1.2]        Past Surgical History:[CK1.1]    Past Surgical History:   Procedure Laterality Date     ANKLE SURGERY       APPENDECTOMY       BYPASS GRAFT ARTERY CORONARY  1999    MI, CABG-CAREY to the  LAD, saphenous vein graft to the diagonal, saphenous vein graft to obtuse  marginal 1, and a separate saphenous vein graft to obtuse marginal 3     CHOLECYSTECTOMY       COLONOSCOPY       EYE SURGERY      cataracts- both eyes and implants     HEART CATH, ANGIOPLASTY  2/20/15     patent blood flow in all vessels including grafts.     ORTHOPEDIC SURGERY      left ankle     SINUS SURGERY[CK1.2]         Family History:[CK1.1]    Family History   Problem Relation Age of Onset     DIABETES       Depression       DIABETES       C.A.D.       Depression Mother      in and out of IP, mom killed self when she was 15     Anxiety Disorder Mother      Depression Maternal Grandmother      Suicide Maternal Grandmother 40     attempted suicide,  laterIP- possibly from drinking lye     Depression Sister      Anxiety Disorder Sister      Bipolar Disorder Sister      Substance Abuse Sister      Depression Sister      Anxiety Disorder Sister[CK1.2]        Social History:[CK1.1]  Social History   Substance Use Topics     Smoking status: Former Smoker     Packs/day: 1.50     Years: 30.00     Types: Cigarettes     Quit date: 3/4/1992     Smokeless tobacco: Never Used     Alcohol use 0.6 oz/week     1 Standard drinks or equivalent per week      Comment: Rare drink with dinner, now doesn't[CK1.2]       Review of Systems  See HPI,[CK1.1] ROS limited secondary to dementia.[CK1.3]     Physical Exam   Vital signs:[CK1.1]  Patient Vitals for the past 24 hrs:   BP Temp Temp src Pulse Resp SpO2 Weight   18 1049 158/89 98.6  F (37  C) Temporal 74 18 99 % 51.7 kg (113 lb 15.7 oz)[CK1.2]       Physical Exam[CK1.1]    Nursing note and vitals reviewed.    Constitutional: Pleasant and well groomed.          HENT: large forehead hematoma   Mouth/Throat: Oropharynx is without swelling or erythema. Oral mucosa moist.    Eyes: Conjunctivae are normal. No scleral icterus.    Neck: moving neck about freely. Some posterior discomfort.     Cardiovascular: Normal rate, regular rhythm and intact distal pulses.    Pulmonary/Chest: Effort normal and breath sounds normal.   Abdominal: Soft.  No distension. There is no tenderness.   Musculoskeletal:  No edema, No calf tenderness  Neurological:Alert and answering questions. PERRL. EOROM intact. No facial drooping. Upper and lower extremity strength intact throughout. Coordination normal.   Skin: Skin is warm and dry.   Psychiatric: Flat affect.[CK1.3]         Emergency Department Course   ECG:[CK1.1]  ECG taken at 1106, ECG read at 1134  NSR  Septal infarct, age undetermined  Abnormal ECG  No change from last on 05/19/18  Rate 63 bpm. CT interval 208. QRS duration 92. QT/QTc 428/437. P-R-T axes 30 76 99.[SJ1.1]     Imaging:[CK1.1]  CT Head w/o Contrast   Final Result   IMPRESSION:    1. Left frontal scalp soft tissue swelling. No fracture or   intracranial hemorrhage.   2.  Atrophy of the brain.  White matter changes consistent with   sequelae of small vessel ischemic disease. This is unchanged.         ROWAN ALFONSO MD      CT Cervical Spine w/o Contrast   Final Result   IMPRESSION:   1. No evidence of acute trauma.   2. Degenerative changes as described above.      ROWAN ALFONSO MD[CK1.4]          Laboratory:[CK1.1]  Labs Ordered and Resulted from Time of ED Arrival Up to the Time of Departure from the ED   CBC WITH PLATELETS DIFFERENTIAL - Abnormal; Notable for the following:        Result Value    RBC Count 3.49 (*)     Hemoglobin 10.8 (*)     Hematocrit 33.0 (*)     All other components within normal limits   BASIC METABOLIC PANEL - Abnormal; Notable for the following:     Glucose 147 (*)     All other components within normal limits   ROUTINE UA WITH MICROSCOPIC - Abnormal; Notable for the following:     pH Urine 8.0 (*)     All other components within normal limits   INR   TROPONIN I   PULSE OXIMETRY NURSING   CARDIAC CONTINUOUS MONITORING   PERIPHERAL IV CATHETER[CK1.4]        Interventions:[CK1.1]  Medications   lidocaine 1 % 1 mL (not administered)   lidocaine (LMX4) kit (not administered)   sodium chloride (PF) 0.9% PF flush 3 mL (not administered)   sodium chloride (PF) 0.9% PF flush 3 mL (not administered)   0.9% sodium chloride BOLUS (0 mLs Intravenous Stopped 6/17/18 1228)     Followed by   sodium chloride 0.9% infusion (1,000 mLs Intravenous New Bag 6/17/18 1228)   propranolol (INDERAL) tablet 20 mg (20 mg Oral Given 6/17/18 1446)[CK1.4]       Emergency Department Course:  I performed the above history and physical examination.   See above for ED evaluation and  Intervention  I explained[CK1.1] findings.   Spoke with staff at [facility] who do not feel comfortable taking her back today.   Spoke with Simran Han who is working with Dr. Kohler who has accepted ongoing care of the patient in observation.[CK1.3]     Impression & Plan           Medical Decision Making:[CK1.1]  Ms Brower was brought to the emergency department for evaluation after she was found down at this morning from a fall.  Differential diagnosis included but was not limited to intracranial cranial hemorrhage, cervical spine injury, occult infection, electrolyte abnormality, less likely arrhythmia.  Aside from the scalp hematoma there are no other signs of trauma.  CT scan of her head and neck did not reveal acute injury.  The remainder of her balance of her evaluation was remarkable only for 1 g drop in her hemoglobin since last month.  Her  describes slow gradual decline over the last year since she has been in this memory care facility, however staff at the facility feel that there has been a more significant change today and do not feel comfortable taking her back tonight.  Simran Valentino that has accepted ongoing care of the patient on the observation unit.  Plan a repeat hemoglobin ongoing evaluation and possible PT evaluation.[CK1.3]      Diagnosis:[CK1.1]    ICD-10-CM    1. Fall, initial  encounter W19.XXXA    2. Closed head injury, initial encounter S09.90XA    3. Anemia, unspecified type D64.9    4. Weakness R53.1    5. Dementia without behavioral disturbance, unspecified dementia type F03.90[CK1.5]        Disposition:[CK1.1]  Transfer Observation[CK1.3]         Halie Nunez MD  06/17/18 1519  [CK1.6]     Revision History        User Key Date/Time User Provider Type Action    > CK1.6 6/17/2018  3:19 PM Halie Nunez MD Physician Sign     CK1.5 6/17/2018  3:18 PM Halie Nunez MD Physician      CK1.4 6/17/2018  3:15 PM Halie Nunez MD Physician      CK1.3 6/17/2018  3:12 PM Halie Nunez MD Physician      SJ1.1 6/17/2018 11:41 AM Micah Hamilton Share     CK1.2 6/17/2018 11:13 AM Halie Nunez MD Physician Share     CK1.1 6/17/2018 11:12 AM Halie Nunez MD Physician                   Procedure Notes     No notes of this type exist for this encounter.         Progress Notes - Therapies (Notes from 06/15/18 through 06/18/18)      Progress Notes by Betzy Medley PT at 6/18/2018  1:22 PM     Author:  Betzy Medley PT Service:  (none) Author Type:  Physical Therapist    Filed:  6/18/2018  1:22 PM Date of Service:  6/18/2018  1:22 PM Creation Time:  6/18/2018  1:22 PM    Status:  Signed :  Betzy Medley PT (Physical Therapist)            06/18/18 1100   Quick Adds   Type of Visit Initial PT Evaluation   Living Environment   Lives With facility resident   Living Arrangements other (see comments)  (Memory Care)   Living Environment Comment SW reports pt lives at a Memory care facility   Self-Care   Equipment Currently Used at Home walker, rolling   Activity/Exercise/Self-Care Comment Pt is a poor historian.  SW reports that  stated to her that patient was able to amb to the dinning room with SBA with a 4WW   Functional Level Prior   Ambulation 3-->assistive equipment and person   Transferring  3-->assistive equipment and person   Fall history within last six months yes   Number of times patient has fallen within last six months 2   Which of the above functional risks had a recent onset or change? ambulation;transferring   Prior Functional Level Comment Per discussion with SW - pt was ambulating with a 4WW to the dinning room with assist; however, pt has a L hand contracture and was not able to fully grasp the 4WW with her L hand.  Apparently pt was seen by PT at her Memory care and they determined that the 4WW was the easiest for the patient to manage as it turns better.     General Information   Onset of Illness/Injury or Date of Surgery - Date 06/15/18   Referring Physician Leticia Hamilton PA-C   Patient/Family Goals Statement Pt unable to state   Pertinent History of Current Problem (include personal factors and/or comorbidities that impact the POC) Caitlyn Brower is a 72 year old female with a PMH significant for dementia, CAD, DM, HTN, HLP, hypothyroidism and B12 deficiency, who presents with recurrent falls. CT of head and cervical spine negative.  Pt has Lewey Body dementia and Parkinsonism sxs.  Per chart,  reports a decline in mobiliy over the past 6 mo.     Precautions/Limitations fall precautions   Heart Disease Risk Factors Diabetes;High blood pressure;Lack of physical activity;Dislipidemia;Medical history;Age   General Observations Pt lying in bed awake   General Info Comments Vitals in sitting: /83, HR 63bpm, O2 sats on RA 97%.     Cognitive Status Examination   Orientation person   Level of Consciousness confused   Follows Commands and Answers Questions 50% of the time;able to follow single-step instructions;unable to follow multi-step instructions   Personal Safety and Judgment impaired   Memory impaired   Cognitive Comment Pt oriented to name only.  Pt repeatedly asking where her  is.     Pain Assessment   Patient Currently in Pain Yes, see Vital Sign flowsheet  (Pt  reports her back hurts and R leg during gait)   Integumentary/Edema   Integumentary/Edema Comments Large bruise above L eye   Posture    Posture Forward head position;Protracted shoulders   Range of Motion (ROM)   ROM Comment Tight hamstrings B with SLR to ~ 55 deg, tight gastrocs with B ankle DF to ~ 15 deg.  Limited active end range B shoulder flex and abd.  AAROM B shld to ~ 160 deg flex.  L hand flexion contracture   Strength   Strength Comments Pt able to lift B UEs antigravity.  B hip flex ~ 3+ to 4-/5, B knee ext ~ 3+/5, B ankle DF atleast 3/5, global mms weakness with poor core strength noted in sitting.   Bed Mobility   Bed Mobility Comments Sup > sit with Min A without bedrail.  Pt slow to initiate all movements.  Pt needing assist at trunk to come to sitting but able to bring LEs over EOB without A.    Transfer Skills   Transfer Comments Sit <> stand CGA with pt pulling up on FWW with B hands despite VCs and hand over hand placement to push off bed.     Gait   Gait Comments Pt amb ~30' with FWW and CGA with festinating gait.  Pt had increased difficulty with turns with MIn A at trunk to prevent retro LOB.  Pt also trialed a NBQC and hemiwalker for ~ 10'  in the R hand with CGA at trunk and descreased safety with gait with pt placeing AD in front of feet with one episode of tripping.  Pt amb without an AD for 15' with CGA and similar gait to using a FWW.  Due to pt's dementia she needs CGA with all mobility either with a FWW (or 4WW at her ) or without an AD.   Balance   Balance Comments Impaired sitting balance with retro lean and feet coming off floor.  Min A at trunk to return to upright sitting posture.  Poor standing balance with CGA/Min A needed with or without AD to maintain balance and prevent retro fall.   Sensory Examination   Sensory Perception Comments NT due to impaired cognition   Coordination   Coordination Comments Decreased coordination noted in all 4 extremities with pt slow to initiate  "all movements and with increased time to complete.     Muscle Tone   Muscle Tone Comments Increased tone in L UE with L hand flexion contracture.  Cogwheel regidits noted in B UEs.   Modality Interventions   Planned Modality Interventions Comments Pt using a heating pad currently for back pain   General Therapy Interventions   Planned Therapy Interventions gait training;transfer training;risk factor education;progressive activity/exercise   Clinical Impression   Criteria for Skilled Therapeutic Intervention yes, treatment indicated   PT Diagnosis Gait abnormality   Influenced by the following impairments Impaired balance, global mms weakness, impaired gait, impaired cognition, decreased UE and LE ROM, L hand contracture   Functional limitations due to impairments Assist needed with all functional mobility, increased falls risk   Clinical Presentation Stable/Uncomplicated   Clinical Presentation Rationale Pt appears to be at or close to her baseline with mobility   Clinical Decision Making (Complexity) Low complexity   Therapy Frequency` other (see comments)  (one time eval and treat)   Predicted Duration of Therapy Intervention (days/wks) one time eval and treat   Anticipated Discharge Disposition Home with Assist  (memory care unit)   Risk & Benefits of therapy have been explained Yes   Patient, Family & other staff in agreement with plan of care Yes   [facility] [employer] TM \"6 Clicks\"   2016, Trustees of [facility], under license to [employer].  All rights reserved.   6 Clicks Short Forms Basic Mobility Inpatient Short Form   [facility] AM-PAC  \"6 Clicks\" V.2 Basic Mobility Inpatient Short Form   1. Turning from your back to your side while in a flat bed without using bedrails? 4 - None   2. Moving from lying on your back to sitting on the side of a flat bed without using bedrails? 3 - A Little   3. Moving to and from a bed to a chair (including a wheelchair)? 3 - A Little   4. Standing up " "from a chair using your arms (e.g., wheelchair, or bedside chair)? 3 - A Little   5. To walk in hospital room? 3 - A Little   6. Climbing 3-5 steps with a railing? 2 - A Lot   Basic Mobility Raw Score (Score out of 24.Lower scores equate to lower levels of function) 18   Total Evaluation Time   Total Evaluation Time (Minutes) 15[BL1.1]        Revision History        User Key Date/Time User Provider Type Action    > BL1.1 6/18/2018  1:22 PM Betzy Medley, PT Physical Therapist Sign                                                      INTERAGENCY TRANSFER FORM - LAB / IMAGING / EKG / EMG RESULTS   6/17/2018                       [facility] OBSERVATION DEPARTMENT: [phone]            Unresulted Labs (24h ago through future)    Start       Ordered    06/18/18 1530  Potassium  TIMED - ONCE,   Timed      06/18/18 1220    Unscheduled  Potassium  (Potassium Replacement - \"Standard\" - For K levels less than 3.4 mmol/L - UU,UR,UA,RH,SH,PH,WY )  CONDITIONAL (SPECIFY),   Routine     Comments:  Obtain Potassium Level for these conditions:  *IF no potassium result within 24 hours before initiation of order set, draw potassium level with next lab collect.    *2 HOURS AFTER last IV potassium replacement dose and 4 hours after an oral replacement dose.  *Next morning after potassium dose.     Repeat Potassium Replacement if necessary.    06/18/18 0800         Lab Results - 3 Days      Vitamin B12 [[id number]]  Resulted: 06/18/18 1125, Result status: Final result    Ordering provider: Simran Han PA-C  06/18/18 0616 Resulting lab: [facility]    Specimen Information    Type Source Collected On     06/18/18 0616          Components       Value Reference Range Flag Lab   Vitamin B12 386 193 - 986 pg/mL  51            TSH with free T4 reflex [[id number]]  Resulted: 06/18/18 0855, Result status: Final result    Ordering provider: Simran Han PA-C  06/18/18 0616 Resulting " lab: [facility]    Specimen Information    Type Source Collected On     06/18/18 0616          Components       Value Reference Range Flag Lab   TSH 0.59 0.40 - 4.00 mU/L  FrRdHs            Basic metabolic panel [[id number]] (Abnormal)  Resulted: 06/18/18 0730, Result status: Final result    Ordering provider: Simran Han PA-C  06/18/18 0000 Resulting lab: [facility]    Specimen Information    Type Source Collected On   Blood  06/18/18 0616          Components       Value Reference Range Flag Lab   Sodium 140 133 - 144 mmol/L  FrRdHs   Potassium 3.2 3.4 - 5.3 mmol/L L FrRdHs   Chloride 107 94 - 109 mmol/L  FrRdHs   Carbon Dioxide 26 20 - 32 mmol/L  FrRdHs   Anion Gap 7 3 - 14 mmol/L  FrRdHs   Glucose 104 70 - 99 mg/dL H FrRdHs   Urea Nitrogen 13 7 - 30 mg/dL  FrRdHs   Creatinine 0.55 0.52 - 1.04 mg/dL  FrRdHs   GFR Estimate >90 >60 mL/min/1.7m2  FrRdHs   Comment:  Non  GFR Calc   GFR Estimate If Black >90 >60 mL/min/1.7m2  FrRdHs   Comment:  African American GFR Calc   Calcium 8.2 8.5 - 10.1 mg/dL L FrRdHs            CBC with platelets differential [[id number]] (Abnormal)  Resulted: 06/18/18 0713, Result status: Final result    Ordering provider: Simran Han PA-C  06/18/18 0000 Resulting lab: [facility]    Specimen Information    Type Source Collected On   Blood  06/18/18 0616          Components       Value Reference Range Flag Lab   WBC 7.2 4.0 - 11.0 10e9/L  FrRdHs   RBC Count 3.43 3.8 - 5.2 10e12/L L FrRdHs   Hemoglobin 10.7 11.7 - 15.7 g/dL L FrRdHs   Hematocrit 32.9 35.0 - 47.0 % L FrRdHs   MCV 96 78 - 100 fl  FrRdHs   MCH 31.2 26.5 - 33.0 pg  FrRdHs   MCHC 32.5 31.5 - 36.5 g/dL  FrRdHs   RDW 13.2 10.0 - 15.0 %  FrRdHs   Platelet Count 219 150 - 450 10e9/L  FrRdHs   Diff Method Automated Method   FrRdHs   % Neutrophils 71.2 %  FrRdHs   % Lymphocytes 17.2 %  FrRdHs   % Monocytes 10.2 %  FrRdHs   % Eosinophils 0.6 %  FrRdHs   % Basophils  0.7 %  FrRdHs   % Immature Granulocytes 0.1 %  FrRdHs   Nucleated RBCs 0 0 /100  FrRdHs   Absolute Neutrophil 5.1 1.6 - 8.3 10e9/L  FrRdHs   Absolute Lymphocytes 1.2 0.8 - 5.3 10e9/L  FrRdHs   Absolute Monocytes 0.7 0.0 - 1.3 10e9/L  FrRdHs   Absolute Eosinophils 0.0 0.0 - 0.7 10e9/L  FrRdHs   Absolute Basophils 0.1 0.0 - 0.2 10e9/L  FrRdHs   Abs Immature Granulocytes 0.0 0 - 0.4 10e9/L  FrRdHs   Absolute Nucleated RBC 0.0   FrRdHs            UA with Microscopic [[id number]] (Abnormal)  Resulted: 06/17/18 1315, Result status: Final result    Ordering provider: Halie Nunez MD  06/17/18 1114 Resulting lab: [facility]    Specimen Information    Type Source Collected On   Catheterized Urine  06/17/18 1245          Components       Value Reference Range Flag Lab   Color Urine Yellow   FrRdHs   Appearance Urine Clear   FrRdHs   Glucose Urine Negative NEG^Negative mg/dL  FrRdHs   Bilirubin Urine Negative NEG^Negative  FrRdHs   Ketones Urine Negative NEG^Negative mg/dL  FrRdHs   Specific Gravity Urine 1.009 1.003 - 1.035  FrRdHs   Blood Urine Negative NEG^Negative  FrRdHs   pH Urine 8.0 5.0 - 7.0 pH H FrRdHs   Protein Albumin Urine Negative NEG^Negative mg/dL  FrRdHs   Urobilinogen mg/dL 0.0 0.0 - 2.0 mg/dL  FrRdHs   Nitrite Urine Negative NEG^Negative  FrRdHs   Leukocyte Esterase Urine Negative NEG^Negative  FrRdHs   Source Catheterized Urine   FrRdHs   WBC Urine 3 0 - 5 /HPF  FrRdHs   RBC Urine 1 0 - 2 /HPF  FrRdHs   Squamous Epithelial /HPF Urine <1 0 - 1 /HPF  FrRdHs            Basic metabolic panel [[id number]] (Abnormal)  Resulted: 06/17/18 1211, Result status: Final result    Ordering provider: Halie Nunez MD  06/17/18 1114 Resulting lab: [facility]    Specimen Information    Type Source Collected On   Blood  06/17/18 1133          Components       Value Reference Range Flag Lab   Sodium 141 133 - 144 mmol/L  FrRdHs   Potassium 3.8 3.4 - 5.3 mmol/L  FrRdHs    Chloride 103 94 - 109 mmol/L  FrRdHs   Carbon Dioxide 28 20 - 32 mmol/L  FrRdHs   Anion Gap 10 3 - 14 mmol/L  FrRdHs   Glucose 147 70 - 99 mg/dL H FrRdHs   Urea Nitrogen 20 7 - 30 mg/dL  FrRdHs   Creatinine 0.72 0.52 - 1.04 mg/dL  FrRdHs   GFR Estimate 79 >60 mL/min/1.7m2  FrRdHs   Comment:  Non  GFR Calc   GFR Estimate If Black >90 >60 mL/min/1.7m2  FrRdHs   Comment:  African American GFR Calc   Calcium 9.0 8.5 - 10.1 mg/dL  FrRdHs            Troponin I [[id number]]  Resulted: 06/17/18 1211, Result status: Final result    Ordering provider: Halie Nunez MD  06/17/18 1114 Resulting lab: [facility]    Specimen Information    Type Source Collected On   Blood  06/17/18 1133          Components       Value Reference Range Flag Lab   Troponin I ES <0.015 0.000 - 0.045 ug/L  FrRd   Comment:         The 99th percentile for upper reference range is 0.045 ug/L.  Troponin values   in the range of 0.045 - 0.120 ug/L may be associated with risks of adverse   clinical events.              INR [[id number]]  Resulted: 06/17/18 1204, Result status: Final result    Ordering provider: Halie Nunez MD  06/17/18 1110 Resulting lab: [facility]    Specimen Information    Type Source Collected On   Blood  06/17/18 1133          Components       Value Reference Range Flag Lab   INR 1.04 0.86 - 1.14  FrRdHs            CBC with platelets differential [[id number]] (Abnormal)  Resulted: 06/17/18 1147, Result status: Final result    Ordering provider: Halie Nunez MD  06/17/18 1116 Resulting lab: [facility]    Specimen Information    Type Source Collected On   Blood  06/17/18 1133          Components       Value Reference Range Flag Lab   WBC 5.3 4.0 - 11.0 10e9/L  FrRdHs   RBC Count 3.49 3.8 - 5.2 10e12/L L FrRdHs   Hemoglobin 10.8 11.7 - 15.7 g/dL L FrRdHs   Hematocrit 33.0 35.0 - 47.0 % L FrRdHs   MCV 95 78 - 100 fl  FrRdHs   MCH 30.9 26.5 -  33.0 pg  FrRdHs   MCHC 32.7 31.5 - 36.5 g/dL  FrRdHs   RDW 13.2 10.0 - 15.0 %  FrRdHs   Platelet Count 229 150 - 450 10e9/L  FrRdHs   Diff Method Automated Method   FrRdHs   % Neutrophils 71.4 %  FrRdHs   % Lymphocytes 17.8 %  FrRdHs   % Monocytes 8.6 %  FrRdHs   % Eosinophils 1.1 %  FrRdHs   % Basophils 0.9 %  FrRdHs   % Immature Granulocytes 0.2 %  FrRdHs   Nucleated RBCs 0 0 /100  FrRdHs   Absolute Neutrophil 3.8 1.6 - 8.3 10e9/L  FrRdHs   Absolute Lymphocytes 1.0 0.8 - 5.3 10e9/L  FrRdHs   Absolute Monocytes 0.5 0.0 - 1.3 10e9/L  FrRdHs   Absolute Eosinophils 0.1 0.0 - 0.7 10e9/L  FrRdHs   Absolute Basophils 0.1 0.0 - 0.2 10e9/L  FrRdHs   Abs Immature Granulocytes 0.0 0 - 0.4 10e9/L  FrRdHs   Absolute Nucleated RBC 0.0   FrRdHs            Testing Performed By     Lab - Abbreviation Name Director Address Valid Date Range    12 - FrRdHs [facility] Unknown [address] [facility] [id number] 05/08/15 1057 - Present    51 - Unknown [facility] Unknown [address] [facility] [address] 12/31/14 1010 - Present               Imaging Results - 3 Days      CT Head w/o Contrast [[id number]]  Resulted: 06/17/18 1341, Result status: Final result    Ordering provider: Halie Nunez MD  06/17/18 1114 Resulted by: Nate Damon MD    Performed: 06/17/18 1247 - 06/17/18 1257 Resulting lab: RADIOLOGY RESULTS    Narrative:       CT SCAN OF THE HEAD WITHOUT CONTRAST   6/17/2018 12:57 PM     HISTORY: Trauma, forehead swelling.    TECHNIQUE:  Axial images of the head and coronal reformations without  IV contrast material. Radiation dose for this scan was reduced using  automated exposure control, adjustment of the mA and/or kV according  to patient size, or iterative reconstruction technique.    COMPARISON: 12/12/2013.    FINDINGS:  There is generalized atrophy of the brain.  There is low  attenuation in the white matter of the cerebral hemispheres consistent  with  sequelae of small vessel ischemic disease. There is no evidence  of intracranial hemorrhage, mass, acute infarct or anomaly.     The visualized portions of the sinuses and mastoids appear normal.  Left frontal scalp soft tissue swelling is noted with no underlying  fracture.      Impression:       IMPRESSION:   1. Left frontal scalp soft tissue swelling. No fracture or  intracranial hemorrhage.  2.  Atrophy of the brain.  White matter changes consistent with  sequelae of small vessel ischemic disease. This is unchanged.      NATE ALFONSO MD      CT Cervical Spine w/o Contrast [[id number]]  Resulted: 06/17/18 1341, Result status: Final result    Ordering provider: Halie Nunez MD  06/17/18 1114 Resulted by: Nate Alfonso MD    Performed: 06/17/18 1247 - 06/17/18 1300 Resulting lab: RADIOLOGY RESULTS    Narrative:       CT CERVICAL SPINE WITHOUT CONTRAST   6/17/2018 1:00 PM     HISTORY: Neck pain after trauma.     TECHNIQUE: Axial images of the cervical spine were obtained without  intravenous contrast. Multiplanar reformations were performed.   Radiation dose for this scan was reduced using automated exposure  control, adjustment of the mA and/or kV according to patient size, or  iterative reconstruction technique.    COMPARISON: None.    FINDINGS: There is no evidence of fracture. there is reversal cervical  lordosis C5-T1.  There is moderate degenerative disc disease at C6-C7  and mild degenerative disc disease at C5-C6 with grade 1  spondylolisthesis. There is no soft tissue swelling. Spinal canal is  unremarkable. Paraspinous soft tissues appear normal.      Impression:       IMPRESSION:  1. No evidence of acute trauma.  2. Degenerative changes as described above.    NATE ALFONSO MD      Testing Performed By     Lab - Abbreviation Name Director Address Valid Date Range    104 - Rad Rslts RADIOLOGY RESULTS Unknown Unknown 02/16/05 1553 - Present            Encounter-Level Documents:     There  are no encounter-level documents.      Order-Level Documents:     There are no order-level documents.

## 2018-06-17 NOTE — PLAN OF CARE
Problem: Patient Care Overview  Goal: Plan of Care/Patient Progress Review  Outcome: No Change  ROOM # 221    Living Situation (if not independent, order SW consult): Memory Care   Facility name:  :      Activity level at baseline: wheeled Walker   Activity level on admit: 1 assist with walker       Patient registered to observation; given Patient Bill of Rights; given the opportunity to ask questions about observation status and their plan of care.  Patient has been oriented to the observation room, bathroom and call light is in place.    Discussed discharge goals and expectations with patient/family.

## 2018-06-17 NOTE — PLAN OF CARE
Problem: Patient Care Overview  Goal: Plan of Care/Patient Progress Review  Outcome: No Change  PRIMARY DIAGNOSIS: Fall     OUTPATIENT/OBSERVATION GOALS TO BE MET BEFORE DISCHARGE  1. Orthostatic performed: N/A    2. Tolerating PO medications: Unsure how patient takes medications, will ask  when he gets here.     3. Return to near baseline physical activity: No, patient has unsteady gait currently     4. Cleared for discharge by consultants (if involved): N/A    Discharge Planner Nurse   Safe discharge environment identified: Yes  Barriers to discharge: yes, PT and SW back to memory care        Entered by: Shira Pope 06/17/2018 6:13 PM     Please review provider order for any additional goals.   Nurse to notify provider when observation goals have been met and patient is ready for discharge.

## 2018-06-17 NOTE — PHARMACY-ADMISSION MEDICATION HISTORY
Admission medication history interview status for this patient is complete. See [facility] admission navigator for allergy information, prior to admission medications and immunization status.     Medication history interview source(s):Patient and spouse  Medication history resources (including written lists, pill bottles, clinic record):medication list  Primary pharmacy:[facility]    Changes made to PTA medication list:  Added: blink tears, ibuprofen, imodium, robitussin, tylenol, nystatin, B12 injection  Deleted: Amlodipine, glipizide, claritin D, miralax, prilosec  Changed: synthroid (88mcg to 75mcg), metformin(1000mg to 500mg BID), propranolol(60mg once daily)    Actions taken by pharmacist (provider contacted, etc):None     Additional medication history information:None    Medication reconciliation/reorder completed by provider prior to medication history? No    Do you take OTC medications (eg tylenol, ibuprofen, fish oil, eye/ear drops, etc)? Yes    For patients on insulin therapy: No      Prior to Admission medications    Medication Sig Last Dose Taking? Auth Provider   acetaminophen (TYLENOL) 325 MG tablet Take 650 mg by mouth every 6 hours as needed for mild pain Past Month at N/A Yes Unknown, Entered By History   ARIPiprazole (ABILIFY) 5 MG tablet Take 5 mg by mouth daily 6/16/2018 at AM Yes Reported, Patient   aspirin 81 MG tablet Take 81 mg by mouth daily Past Week at N/A Yes Unknown, Entered By History   Calcium Carbonate-Vitamin D (CALCIUM + D) 600-200 MG-UNIT per tablet Take 1 tablet by mouth daily  6/16/2018 at PM Yes Reported, Patient   cyanocobalamin (VITAMIN B12) 1000 MCG/ML injection Inject 1 mL into the muscle every 30 days 5/31/2018 at N/A Yes Unknown, Entered By History   DULoxetine (CYMBALTA) 60 MG capsule Take 60 mg by mouth every evening at 1700 6/16/2018 at PM Yes Reported, Patient   fluticasone (FLONASE) 50 MCG/ACT nasal spray Spray 2 sprays into both nostrils daily 6/16/2018 at AM  Yes Reported, Patient   guaiFENesin-dextromethorphan (ROBITUSSIN DM) 100-10 MG/5ML syrup Take 10 mLs by mouth every 6 hours as needed for cough Past Month at N/A Yes Unknown, Entered By History   ibuprofen (ADVIL/MOTRIN) 200 MG tablet Take 200 mg by mouth every 4 hours as needed for mild pain Past Month at N/A Yes Unknown, Entered By History   levothyroxine (SYNTHROID/LEVOTHROID) 75 MCG tablet Take 75 mcg by mouth daily 6/16/2018 at AM Yes Unknown, Entered By History   loperamide (IMODIUM) 2 MG capsule Take 4 mg by mouth 2 times daily as needed for diarrhea Past Month at N/A Yes Unknown, Entered By History   metFORMIN (GLUCOPHAGE) 500 MG tablet Take 500 mg by mouth 2 times daily (with meals) 6/16/2018 at PM Yes Unknown, Entered By History   mirtazapine (REMERON) 15 MG tablet Take 15 mg by mouth At Bedtime 6/16/2018 at PM Yes Unknown, Entered By History   Multiple Vitamin (DAILY MULTIVITAMIN PO) Take 1 tablet by mouth daily At noon 6/16/2018 at PM Yes Reported, Patient   nystatin (MYCOSTATIN) cream Apply topically 2 times daily 6/16/2018 at PM Yes Unknown, Entered By History   polyethylene glycol 400 (BLINK TEARS) 0.25 % SOLN ophthalmic solution Place 1 drop into both eyes 3 times daily as needed for dry eyes Past Month at N/A Yes Unknown, Entered By History   propranolol HCl 60 MG TABS Take 1 tablet by mouth daily 6/16/2018 at AM Yes Unknown, Entered By History

## 2018-06-17 NOTE — ED NOTES
Pt continues to get out of bed without assistance. Pt has a very unsteady gait. Bed alarm in place.

## 2018-06-18 ENCOUNTER — APPOINTMENT (OUTPATIENT)
Dept: PHYSICAL THERAPY | Facility: CLINIC | Age: 73
End: 2018-06-18
Attending: PHYSICIAN ASSISTANT
Payer: MEDICARE

## 2018-06-18 VITALS
WEIGHT: 125.3 LBS | HEIGHT: 66 IN | BODY MASS INDEX: 20.14 KG/M2 | RESPIRATION RATE: 16 BRPM | HEART RATE: 60 BPM | SYSTOLIC BLOOD PRESSURE: 149 MMHG | OXYGEN SATURATION: 96 % | TEMPERATURE: 97.6 F | DIASTOLIC BLOOD PRESSURE: 80 MMHG

## 2018-06-18 LAB
ANION GAP SERPL CALCULATED.3IONS-SCNC: 7 MMOL/L (ref 3–14)
BASOPHILS # BLD AUTO: 0.1 10E9/L (ref 0–0.2)
BASOPHILS NFR BLD AUTO: 0.7 %
BUN SERPL-MCNC: 13 MG/DL (ref 7–30)
CALCIUM SERPL-MCNC: 8.2 MG/DL (ref 8.5–10.1)
CHLORIDE SERPL-SCNC: 107 MMOL/L (ref 94–109)
CO2 SERPL-SCNC: 26 MMOL/L (ref 20–32)
CREAT SERPL-MCNC: 0.55 MG/DL (ref 0.52–1.04)
DIFFERENTIAL METHOD BLD: ABNORMAL
EOSINOPHIL # BLD AUTO: 0 10E9/L (ref 0–0.7)
EOSINOPHIL NFR BLD AUTO: 0.6 %
ERYTHROCYTE [DISTWIDTH] IN BLOOD BY AUTOMATED COUNT: 13.2 % (ref 10–15)
GFR SERPL CREATININE-BSD FRML MDRD: >90 ML/MIN/1.7M2
GLUCOSE SERPL-MCNC: 104 MG/DL (ref 70–99)
HCT VFR BLD AUTO: 32.9 % (ref 35–47)
HGB BLD-MCNC: 10.7 G/DL (ref 11.7–15.7)
IMM GRANULOCYTES # BLD: 0 10E9/L (ref 0–0.4)
IMM GRANULOCYTES NFR BLD: 0.1 %
INTERPRETATION ECG - MUSE: NORMAL
LYMPHOCYTES # BLD AUTO: 1.2 10E9/L (ref 0.8–5.3)
LYMPHOCYTES NFR BLD AUTO: 17.2 %
MCH RBC QN AUTO: 31.2 PG (ref 26.5–33)
MCHC RBC AUTO-ENTMCNC: 32.5 G/DL (ref 31.5–36.5)
MCV RBC AUTO: 96 FL (ref 78–100)
MONOCYTES # BLD AUTO: 0.7 10E9/L (ref 0–1.3)
MONOCYTES NFR BLD AUTO: 10.2 %
NEUTROPHILS # BLD AUTO: 5.1 10E9/L (ref 1.6–8.3)
NEUTROPHILS NFR BLD AUTO: 71.2 %
NRBC # BLD AUTO: 0 10*3/UL
NRBC BLD AUTO-RTO: 0 /100
PLATELET # BLD AUTO: 219 10E9/L (ref 150–450)
POTASSIUM SERPL-SCNC: 3.2 MMOL/L (ref 3.4–5.3)
RBC # BLD AUTO: 3.43 10E12/L (ref 3.8–5.2)
SODIUM SERPL-SCNC: 140 MMOL/L (ref 133–144)
TSH SERPL DL<=0.005 MIU/L-ACNC: 0.59 MU/L (ref 0.4–4)
VIT B12 SERPL-MCNC: 386 PG/ML (ref 193–986)
WBC # BLD AUTO: 7.2 10E9/L (ref 4–11)

## 2018-06-18 PROCEDURE — 82607 VITAMIN B-12: CPT | Performed by: PHYSICIAN ASSISTANT

## 2018-06-18 PROCEDURE — 25000132 ZZH RX MED GY IP 250 OP 250 PS 637: Mod: GY | Performed by: PHYSICIAN ASSISTANT

## 2018-06-18 PROCEDURE — 99217 ZZC OBSERVATION CARE DISCHARGE: CPT | Performed by: PHYSICIAN ASSISTANT

## 2018-06-18 PROCEDURE — 97116 GAIT TRAINING THERAPY: CPT | Mod: GP,59 | Performed by: PHYSICAL THERAPIST

## 2018-06-18 PROCEDURE — 84132 ASSAY OF SERUM POTASSIUM: CPT | Performed by: PHYSICIAN ASSISTANT

## 2018-06-18 PROCEDURE — 85025 COMPLETE CBC W/AUTO DIFF WBC: CPT | Performed by: PHYSICIAN ASSISTANT

## 2018-06-18 PROCEDURE — 25000128 H RX IP 250 OP 636: Performed by: PHYSICIAN ASSISTANT

## 2018-06-18 PROCEDURE — A9270 NON-COVERED ITEM OR SERVICE: HCPCS | Mod: GY | Performed by: PHYSICIAN ASSISTANT

## 2018-06-18 PROCEDURE — 84443 ASSAY THYROID STIM HORMONE: CPT | Performed by: PHYSICIAN ASSISTANT

## 2018-06-18 PROCEDURE — 97161 PT EVAL LOW COMPLEX 20 MIN: CPT | Mod: GP | Performed by: PHYSICAL THERAPIST

## 2018-06-18 PROCEDURE — G0378 HOSPITAL OBSERVATION PER HR: HCPCS

## 2018-06-18 PROCEDURE — 40000193 ZZH STATISTIC PT WARD VISIT: Performed by: PHYSICAL THERAPIST

## 2018-06-18 PROCEDURE — 36415 COLL VENOUS BLD VENIPUNCTURE: CPT | Performed by: PHYSICIAN ASSISTANT

## 2018-06-18 PROCEDURE — 96361 HYDRATE IV INFUSION ADD-ON: CPT

## 2018-06-18 PROCEDURE — 97530 THERAPEUTIC ACTIVITIES: CPT | Mod: GP | Performed by: PHYSICAL THERAPIST

## 2018-06-18 PROCEDURE — 80048 BASIC METABOLIC PNL TOTAL CA: CPT | Performed by: PHYSICIAN ASSISTANT

## 2018-06-18 RX ORDER — POTASSIUM CHLORIDE 7.45 MG/ML
10 INJECTION INTRAVENOUS
Status: DISCONTINUED | OUTPATIENT
Start: 2018-06-18 | End: 2018-06-18 | Stop reason: HOSPADM

## 2018-06-18 RX ORDER — POTASSIUM CHLORIDE 1.5 G/1.58G
20-40 POWDER, FOR SOLUTION ORAL
Status: DISCONTINUED | OUTPATIENT
Start: 2018-06-18 | End: 2018-06-18 | Stop reason: HOSPADM

## 2018-06-18 RX ORDER — POTASSIUM CHLORIDE 1500 MG/1
20-40 TABLET, EXTENDED RELEASE ORAL
Status: DISCONTINUED | OUTPATIENT
Start: 2018-06-18 | End: 2018-06-18 | Stop reason: HOSPADM

## 2018-06-18 RX ORDER — POTASSIUM CHLORIDE 29.8 MG/ML
20 INJECTION INTRAVENOUS
Status: DISCONTINUED | OUTPATIENT
Start: 2018-06-18 | End: 2018-06-18 | Stop reason: HOSPADM

## 2018-06-18 RX ORDER — POTASSIUM CL/LIDO/0.9 % NACL 10MEQ/0.1L
10 INTRAVENOUS SOLUTION, PIGGYBACK (ML) INTRAVENOUS
Status: DISCONTINUED | OUTPATIENT
Start: 2018-06-18 | End: 2018-06-18 | Stop reason: HOSPADM

## 2018-06-18 RX ADMIN — LEVOTHYROXINE SODIUM 75 MCG: 75 TABLET ORAL at 07:32

## 2018-06-18 RX ADMIN — ACETAMINOPHEN 650 MG: 325 TABLET, FILM COATED ORAL at 09:53

## 2018-06-18 RX ADMIN — PROPRANOLOL HYDROCHLORIDE 60 MG: 20 TABLET ORAL at 07:38

## 2018-06-18 RX ADMIN — POTASSIUM CHLORIDE 20 MEQ: 1500 TABLET, EXTENDED RELEASE ORAL at 11:36

## 2018-06-18 RX ADMIN — ARIPIPRAZOLE 5 MG: 2 TABLET ORAL at 07:32

## 2018-06-18 RX ADMIN — METFORMIN HYDROCHLORIDE 500 MG: 500 TABLET, FILM COATED ORAL at 07:32

## 2018-06-18 RX ADMIN — POTASSIUM CHLORIDE 40 MEQ: 1500 TABLET, EXTENDED RELEASE ORAL at 09:30

## 2018-06-18 RX ADMIN — SODIUM CHLORIDE 1000 ML: 9 INJECTION, SOLUTION INTRAVENOUS at 04:00

## 2018-06-18 NOTE — PLAN OF CARE
Problem: Patient Care Overview  Goal: Plan of Care/Patient Progress Review  PRIMARY DIAGNOSIS: GENERALIZED WEAKNESS    OUTPATIENT/OBSERVATION GOALS TO BE MET BEFORE DISCHARGE  1. Orthostatic performed: N/A    2. Tolerating PO medications: Yes    3. Return to near baseline physical activity: No, pt is requiring Ax2 with walker at this time.     4. Cleared for discharge by consultants (if involved): No, Pt and SW consults     Pt is oriented to self. Pt does have L forehead hematoma from previous fall. Pt remains incontinent at times. Pt is to have SW and PT consult today. Hgb was 10.8 recheck in AM. UA was negative. Imaging showed no acute changed. Pt requires total assistance with feeding and is on regular diet. Pt transfers with ax2 and walker. NS at 100. Will continue to monitor.     Discharge Planner Nurse   Safe discharge environment identified: SW and PT to determine  Barriers to discharge: Yes       Entered by: Linda Arguello 06/18/2018 1:55 AM     Please review provider order for any additional goals.   Nurse to notify provider when observation goals have been met and patient is ready for discharge.

## 2018-06-18 NOTE — PLAN OF CARE
Problem: Patient Care Overview  Goal: Plan of Care/Patient Progress Review  Discharge Planner PT  PT eval and treatment completed. Caitlyn Brower is a 72 year old female with a PMH significant for dementia, CAD, DM, HTN, HLP, hypothyroidism and B12 deficiency, who presents with recurrent falls. CT of head and cervical spine negative.  Pt has Lewey Body dementia and Parkinsonism sxs.  Per chart,  reports a decline in mobiliy over the past 6 mo.  Pt lives at a Memory Care facility.  Patient plan for discharge: Unable to state due to dementia  Current status: Pt transfers sup > sit with Min A at trunk.  Sit <> stand to FWW with CGA with pt pulling up on FWW with R hand.  Assist needed to open L hand due to flexion contracture, to place on FWW.  Pt amb ~ 30' with FWW and CGA/SBA with slow festinating gait.  Min A needed at trunk with turns due to retro LOB.  Pt able to maintain partial wt bearing through L hand on FWW to assist with steering.  Trial of gait with R NBQC and hemiwalker with more impaired gait pattern with decreased speed, increased festination and decreased safety with pt placing AD in front of feet with one episode of tripping on quad cane.  Pt also amb ~ 15' without an AD with CGA with similar gait speed and pattern as with FWW.  Pt confused throughout session, oriented to name only.    Barriers to return to prior living situation: None, per SW, pt able to return to Memory care with current level of A (CGA/Min A with all mobility)  Recommendations for discharge: Home with assist for all mobility  Rationale for recommendations: Pt appear to be at or close to her baseline with all mobility.  Due to her dementia and Parkinsonism sxs pt needs CGA/Min A with all functional mobility for safety to prevent falls.         Entered by: Betzy Medley 06/18/2018 1:23 PM

## 2018-06-18 NOTE — PLAN OF CARE
Problem: Patient Care Overview  Goal: Plan of Care/Patient Progress Review  Outcome: Improving  PRIMARY DIAGNOSIS: GENERALIZED WEAKNESS     OUTPATIENT/OBSERVATION GOALS TO BE MET BEFORE DISCHARGE  1. Orthostatic performed: Yes:          Lying Orthostatic BP: 144/64         Sitting Orthostatic BP: 157/81         Standing Orthostatic BP: 160/83      2. Tolerating PO medications: Yes     3. Return to near baseline physical activity: No     4. Cleared for discharge by consultants (if involved): No     Discharge Planner Nurse   Safe discharge environment identified: Yes  Barriers to discharge: No       Entered by: Hailee Jackson 06/18/2018 8:46 AM  Please review provider order for any additional goals.   Nurse to notify provider when observation goals have been met and patient is ready for discharge.     Patient is oriented to self only. Patient is very confused and forgetful. Patient denies any pain/lightheadedness/dizziness. Patient is up with an assist of 1. Patient has bruises and redness on face and top of head due to falls at home. Patient's VSS. K+ came back at 3.2 and was given potassium protocol. Patient was seen by PT today and will discharge home today back to memory care.

## 2018-06-18 NOTE — DISCHARGE SUMMARY
Discharge Summary  Hospitalist Service    Caitlyn Brower MRN# [id number]   YOB: 1945 Age: 72 year old     Date of Admission:  6/17/2018  Date of Discharge:  6/18/2018  Admitting Physician: Ying Kohler MD  Discharge Physician: Leticia Hamilton PA-C  Discharging Service: Hospitalist Service     Primary Provider: Barbara Manzanares  Primary Care Physician Phone Number: [phone]         Discharge Diagnoses/Problem Oriented Hospital Course (Providers):    Caitlyn Brower was admitted on 6/17/2018 by Ying Kohler MD and I would refer you to their history and physical.  She presented to the ED with recurrent falls and head trauma.  CT of the head and cervical spine were negative.  She was given a small bolus of fluid but was unable to return to her care facility due to staffing and was requested for observation admission.  While here she had no abnormal vital signs suggesting infection.  Physical therapy assessed her and actually recommended she not use assistive devices but instead have a person help her to ambulate.  Social work facilitated in discharging her back to her home.  The following problems were addressed during her hospitalization:    1.  Recurrent falls related to Lewy body dementia- per her  she is followed by neurology and her  has noted a gradual decline over the last 6 months.  She falls rather frequently.  Her most recent fall she hit her head.  CT of the head and cervical spine are negative.  She has a parkinsonian gait along with significant dementia making it difficult for her to follow directions.  She was assessed by physical therapy here who actually recommended not using a walker as this increases her risk of falling.  If anything she may benefit from a cane but otherwise could use standby assist when mobile.    No other medical issues were addressed on this admission.  All home medications were resumed.         Code Status:       Full Code        Brief Hospital Stay Summary Sent Home With Patient in AVS:        Reason for your hospital stay       You were admitted for concerns of falling. We did a CT of your head that   was negative. We also did a CT of your cervical spine that was negative.   Likely your fall is related to your underlying balance issues. We actually   think assistive devices make this worse and recommend recommend walking   with contact guard assist at all times.                    # Discharge Pain Plan:   - Patient currently has NO PAIN and is not being prescribed pain medications on discharge.                Pending Results:        Unresulted Labs Ordered in the Past 30 Days of this Admission     No orders found for last 61 day(s).            Discharge Instructions and Follow-Up:      Follow-up Appointments     Follow-up and recommended labs and tests        Follow up as needed with primary provider                      Discharge Disposition:      Discharged to home         Discharge Medications:        Current Discharge Medication List      CONTINUE these medications which have NOT CHANGED    Details   acetaminophen (TYLENOL) 325 MG tablet Take 650 mg by mouth every 6 hours as needed for mild pain      ARIPiprazole (ABILIFY) 5 MG tablet Take 5 mg by mouth daily      aspirin 81 MG tablet Take 81 mg by mouth daily      Calcium Carbonate-Vitamin D (CALCIUM + D) 600-200 MG-UNIT per tablet Take 1 tablet by mouth daily       cyanocobalamin (VITAMIN B12) 1000 MCG/ML injection Inject 1 mL into the muscle every 30 days      DULoxetine (CYMBALTA) 60 MG capsule Take 60 mg by mouth every evening at 1700      fluticasone (FLONASE) 50 MCG/ACT nasal spray Spray 2 sprays into both nostrils daily      guaiFENesin-dextromethorphan (ROBITUSSIN DM) 100-10 MG/5ML syrup Take 10 mLs by mouth every 6 hours as needed for cough      ibuprofen (ADVIL/MOTRIN) 200 MG tablet Take 200 mg by mouth every 4 hours as needed for mild pain     "  levothyroxine (SYNTHROID/LEVOTHROID) 75 MCG tablet Take 75 mcg by mouth daily      loperamide (IMODIUM) 2 MG capsule Take 4 mg by mouth 2 times daily as needed for diarrhea      metFORMIN (GLUCOPHAGE) 500 MG tablet Take 500 mg by mouth 2 times daily (with meals)      mirtazapine (REMERON) 15 MG tablet Take 15 mg by mouth At Bedtime      Multiple Vitamin (DAILY MULTIVITAMIN PO) Take 1 tablet by mouth daily At noon      nystatin (MYCOSTATIN) cream Apply topically 2 times daily      polyethylene glycol 400 (BLINK TEARS) 0.25 % SOLN ophthalmic solution Place 1 drop into both eyes 3 times daily as needed for dry eyes      propranolol HCl 60 MG TABS Take 1 tablet by mouth daily               Allergies:         Allergies   Allergen Reactions     Sulfa Drugs      Synthroid [Levothyroxine]            Consultations This Hospital Stay:      No consultations were requested during this admission         Condition and Physical on Discharge:      Discharge condition: Stable   Vitals: Blood pressure 181/79, pulse 60, temperature 96.7  F (35.9  C), temperature source Oral, resp. rate 16, height 1.664 m (5' 5.5\"), weight 56.8 kg (125 lb 4.8 oz), SpO2 96 %.     Constitutional:  Patient is alert but not oriented.  She does not answer any questions appropriately.  She has to have help with feeding and needs persistent cueing while ambulating or performing activities.   Lungs:  Clear to auscultation bilaterally   Cardiovascular:  Regular rate and rhythm   Abdomen:  Bowel sounds are present with no tenderness   Skin:  Patient has ecchymosis noted on her face but no open sores   Other:          Discharge Time:      Less than 30 minutes.        Image Results From This Hospital Stay (For Non-EPIC Providers):        Results for orders placed or performed during the hospital encounter of 06/17/18   CT Head w/o Contrast    Narrative    CT SCAN OF THE HEAD WITHOUT CONTRAST   6/17/2018 12:57 PM     HISTORY: Trauma, forehead " swelling.    TECHNIQUE:  Axial images of the head and coronal reformations without  IV contrast material. Radiation dose for this scan was reduced using  automated exposure control, adjustment of the mA and/or kV according  to patient size, or iterative reconstruction technique.    COMPARISON: 12/12/2013.    FINDINGS:  There is generalized atrophy of the brain.  There is low  attenuation in the white matter of the cerebral hemispheres consistent  with sequelae of small vessel ischemic disease. There is no evidence  of intracranial hemorrhage, mass, acute infarct or anomaly.     The visualized portions of the sinuses and mastoids appear normal.  Left frontal scalp soft tissue swelling is noted with no underlying  fracture.      Impression    IMPRESSION:   1. Left frontal scalp soft tissue swelling. No fracture or  intracranial hemorrhage.  2.  Atrophy of the brain.  White matter changes consistent with  sequelae of small vessel ischemic disease. This is unchanged.      ROWAN ALFONSO MD   CT Cervical Spine w/o Contrast    Narrative    CT CERVICAL SPINE WITHOUT CONTRAST   6/17/2018 1:00 PM     HISTORY: Neck pain after trauma.     TECHNIQUE: Axial images of the cervical spine were obtained without  intravenous contrast. Multiplanar reformations were performed.   Radiation dose for this scan was reduced using automated exposure  control, adjustment of the mA and/or kV according to patient size, or  iterative reconstruction technique.    COMPARISON: None.    FINDINGS: There is no evidence of fracture. there is reversal cervical  lordosis C5-T1.  There is moderate degenerative disc disease at C6-C7  and mild degenerative disc disease at C5-C6 with grade 1  spondylolisthesis. There is no soft tissue swelling. Spinal canal is  unremarkable. Paraspinous soft tissues appear normal.      Impression    IMPRESSION:  1. No evidence of acute trauma.  2. Degenerative changes as described above.    ROWAN ALFONSO MD           Most  Recent Lab Results In EPIC (For Non-EPIC Providers):    Most Recent 3 CBC's:  Recent Labs   Lab Test  06/18/18   0616  06/17/18   1133  01/22/16   0105   WBC  7.2  5.3  7.1   HGB  10.7*  10.8*  11.6*   MCV  96  95  92   PLT  219  229  284      Most Recent 3 BMP's:  Recent Labs   Lab Test  06/18/18   0616  06/17/18   1133 10/11/17 12/12/16   NA  140  141  143  141.0   POTASSIUM  3.2*  3.8  3.8  4.4   CHLORIDE  107  103  106  105.0   CO2  26  28   --   27.8   BUN  13  20  15.0  26.0*   CR  0.55  0.72  0.92  1.2   ANIONGAP  7  10   --   12.6   NATI  8.2*  9.0  9.1  9.3   GLC  104*  147*  118.0*  239.0*     Most Recent 3 Troponin's:No lab results found.  Most Recent 3 INR's:  Recent Labs   Lab Test  06/17/18   1133   INR  1.04     Most Recent 2 LFT's:  Recent Labs   Lab Test 10/11/17  01/22/16   0105   AST  21.0  30   ALT  23.0  33   ALKPHOS  47  50   BILITOTAL  0.31  0.3     Most Recent Cholesterol Panel:  Recent Labs   Lab Test 10/11/17   CHOL  146   LDL  79.8   HDL  49   TRIG  86.0     Most Recent 6 Bacteria Isolates From Any Culture (See EPIC Reports for Culture Details):No lab results found.  Most Recent TSH, T4 and HgbA1c:   Recent Labs   Lab Test  06/18/18   0616 12/12/16   TSH  0.59   --    A1C   --   7.7*

## 2018-06-18 NOTE — PLAN OF CARE
Problem: Patient Care Overview  Goal: Plan of Care/Patient Progress Review  Outcome: No Change  PRIMARY DIAGNOSIS: GENERALIZED WEAKNESS    OUTPATIENT/OBSERVATION GOALS TO BE MET BEFORE DISCHARGE  1. Orthostatic performed: Yes:          Lying Orthostatic BP: 144/64         Sitting Orthostatic BP: 157/81         Standing Orthostatic BP: 160/83     2. Tolerating PO medications: Yes    3. Return to near baseline physical activity: No    4. Cleared for discharge by consultants (if involved): No    Discharge Planner Nurse   Safe discharge environment identified: Yes  Barriers to discharge: No       Entered by: Hailee Jackson 06/18/2018 8:46 AM     Please review provider order for any additional goals.   Nurse to notify provider when observation goals have been met and patient is ready for discharge.    Patient is oriented to self only. Patient is very confused and forgetful. Patient denies any pain/lightheadedness/dizziness. Patient is up with an assist of 2. Patient has bruises and redness on face and top of head due to falls at home. Patient's VSS. K+ came back at 3.2, will give protocol. PT and SW consults in place. Will continue to monitor.

## 2018-06-18 NOTE — PLAN OF CARE
Problem: Patient Care Overview  Goal: Plan of Care/Patient Progress Review  PRIMARY DIAGNOSIS: GENERALIZED WEAKNESS/FALL     OUTPATIENT/OBSERVATION GOALS TO BE MET BEFORE DISCHARGE  1. Orthostatic performed: N/A     2. Tolerating PO medications: Yes     3. Return to near baseline physical activity: No, pt is requiring Ax2 with walker at this time.      4. Cleared for discharge by consultants (if involved): No, Pt and SW consults      Pt is oriented to self. Pt does have L forehead hematoma from previous fall. Pt remains incontinent at times. Pt is to have SW and PT consult today. Hgb was 10.8 recheck in AM. UA was negative. Imaging showed no acute changed. Pt requires total assistance with feeding and is on regular diet. Pt transfers with ax2 and walker. NS at 100. Will continue to monitor.    Temp: 97.5  F (36.4  C) Temp src: Temporal BP: 152/71  Heart Rate: 57 Resp: 16 SpO2: 96 % O2 Device: None (Room air)    Discharge Planner Nurse   Safe discharge environment identified: SW and PT to determine  Barriers to discharge: Yes       Entered by: Linda Arguello 06/18/2018 3:55 AM  Please review provider order for any additional goals.   Nurse to notify provider when observation goals have been met and patient is ready for discharge.

## 2018-06-18 NOTE — PLAN OF CARE
Problem: PT General Care Plan  Goal: PT target date for goal attainment  Outcome: Adequate for Discharge Date Met: 06/18/18  Patient's After Visit Summary was reviewed with patient and .  Patient verbalized understanding of After Visit Summary, recommended follow up and was given an opportunity to ask questions.   Discharge medications sent home with patient/family: Not applicable   Discharged with spouse      OBSERVATION patient END time: 1339    Patient was stable and provided a wheelchair ride out to car at discharge.

## 2018-06-18 NOTE — PLAN OF CARE
Problem: Patient Care Overview  Goal: Plan of Care/Patient Progress Review  Physical Therapy Discharge Summary    Reason for therapy discharge:    Discharged to home.  Memory care unit    Progress towards therapy goal(s). See goals on Care Plan in [facility] electronic health record for goal details.  Goals met    Therapy recommendation(s):    No further therapy is recommended.  Pt appears to be at or close to her baseline with all mobility.  CGA/Min A recommended with all functional mobility for safety.

## 2018-06-18 NOTE — PROGRESS NOTES
06/18/18 1100   Quick Adds   Type of Visit Initial PT Evaluation   Living Environment   Lives With facility resident   Living Arrangements other (see comments)  (Memory Care)   Living Environment Comment SW reports pt lives at a Memory care facility   Self-Care   Equipment Currently Used at Home walker, rolling   Activity/Exercise/Self-Care Comment Pt is a poor historian.  SW reports that  stated to her that patient was able to amb to the dinning room with SBA with a 4WW   Functional Level Prior   Ambulation 3-->assistive equipment and person   Transferring 3-->assistive equipment and person   Fall history within last six months yes   Number of times patient has fallen within last six months 2   Which of the above functional risks had a recent onset or change? ambulation;transferring   Prior Functional Level Comment Per discussion with SW - pt was ambulating with a 4WW to the dinning room with assist; however, pt has a L hand contracture and was not able to fully grasp the 4WW with her L hand.  Apparently pt was seen by PT at her Memory care and they determined that the 4WW was the easiest for the patient to manage as it turns better.     General Information   Onset of Illness/Injury or Date of Surgery - Date 06/15/18   Referring Physician Leticia Hamilton PA-C   Patient/Family Goals Statement Pt unable to state   Pertinent History of Current Problem (include personal factors and/or comorbidities that impact the POC) Caitlyn Brower is a 72 year old female with a PMH significant for dementia, CAD, DM, HTN, HLP, hypothyroidism and B12 deficiency, who presents with recurrent falls. CT of head and cervical spine negative.  Pt has Lewey Body dementia and Parkinsonism sxs.  Per chart,  reports a decline in mobiliy over the past 6 mo.     Precautions/Limitations fall precautions   Heart Disease Risk Factors Diabetes;High blood pressure;Lack of physical activity;Dislipidemia;Medical history;Age   General  Observations Pt lying in bed awake   General Info Comments Vitals in sitting: /83, HR 63bpm, O2 sats on RA 97%.     Cognitive Status Examination   Orientation person   Level of Consciousness confused   Follows Commands and Answers Questions 50% of the time;able to follow single-step instructions;unable to follow multi-step instructions   Personal Safety and Judgment impaired   Memory impaired   Cognitive Comment Pt oriented to name only.  Pt repeatedly asking where her  is.     Pain Assessment   Patient Currently in Pain Yes, see Vital Sign flowsheet  (Pt reports her back hurts and R leg during gait)   Integumentary/Edema   Integumentary/Edema Comments Large bruise above L eye   Posture    Posture Forward head position;Protracted shoulders   Range of Motion (ROM)   ROM Comment Tight hamstrings B with SLR to ~ 55 deg, tight gastrocs with B ankle DF to ~ 15 deg.  Limited active end range B shoulder flex and abd.  AAROM B shld to ~ 160 deg flex.  L hand flexion contracture   Strength   Strength Comments Pt able to lift B UEs antigravity.  B hip flex ~ 3+ to 4-/5, B knee ext ~ 3+/5, B ankle DF atleast 3/5, global mms weakness with poor core strength noted in sitting.   Bed Mobility   Bed Mobility Comments Sup > sit with Min A without bedrail.  Pt slow to initiate all movements.  Pt needing assist at trunk to come to sitting but able to bring LEs over EOB without A.    Transfer Skills   Transfer Comments Sit <> stand CGA with pt pulling up on FWW with B hands despite VCs and hand over hand placement to push off bed.     Gait   Gait Comments Pt amb ~30' with FWW and CGA with festinating gait.  Pt had increased difficulty with turns with MIn A at trunk to prevent retro LOB.  Pt also trialed a NBQC and hemiwalker for ~ 10'  in the R hand with CGA at trunk and descreased safety with gait with pt placeing AD in front of feet with one episode of tripping.  Pt amb without an AD for 15' with CGA and similar gait to  using a FWW.  Due to pt's dementia she needs CGA with all mobility either with a FWW (or 4WW at her ) or without an AD.   Balance   Balance Comments Impaired sitting balance with retro lean and feet coming off floor.  Min A at trunk to return to upright sitting posture.  Poor standing balance with CGA/Min A needed with or without AD to maintain balance and prevent retro fall.   Sensory Examination   Sensory Perception Comments NT due to impaired cognition   Coordination   Coordination Comments Decreased coordination noted in all 4 extremities with pt slow to initiate all movements and with increased time to complete.     Muscle Tone   Muscle Tone Comments Increased tone in L UE with L hand flexion contracture.  Cogwheel regidits noted in B UEs.   Modality Interventions   Planned Modality Interventions Comments Pt using a heating pad currently for back pain   General Therapy Interventions   Planned Therapy Interventions gait training;transfer training;risk factor education;progressive activity/exercise   Clinical Impression   Criteria for Skilled Therapeutic Intervention yes, treatment indicated   PT Diagnosis Gait abnormality   Influenced by the following impairments Impaired balance, global mms weakness, impaired gait, impaired cognition, decreased UE and LE ROM, L hand contracture   Functional limitations due to impairments Assist needed with all functional mobility, increased falls risk   Clinical Presentation Stable/Uncomplicated   Clinical Presentation Rationale Pt appears to be at or close to her baseline with mobility   Clinical Decision Making (Complexity) Low complexity   Therapy Frequency` other (see comments)  (one time eval and treat)   Predicted Duration of Therapy Intervention (days/wks) one time eval and treat   Anticipated Discharge Disposition Home with Assist  (memory care unit)   Risk & Benefits of therapy have been explained Yes   Patient, Family & other staff in agreement with plan of care Yes  "  [facility] TM \"6 Clicks\"   2016, Trustees of [facility], under license to [place].  All rights reserved.   6 Clicks Short Forms Basic Mobility Inpatient Short Form   [facility]  \"6 Clicks\" V.2 Basic Mobility Inpatient Short Form   1. Turning from your back to your side while in a flat bed without using bedrails? 4 - None   2. Moving from lying on your back to sitting on the side of a flat bed without using bedrails? 3 - A Little   3. Moving to and from a bed to a chair (including a wheelchair)? 3 - A Little   4. Standing up from a chair using your arms (e.g., wheelchair, or bedside chair)? 3 - A Little   5. To walk in hospital room? 3 - A Little   6. Climbing 3-5 steps with a railing? 2 - A Lot   Basic Mobility Raw Score (Score out of 24.Lower scores equate to lower levels of function) 18   Total Evaluation Time   Total Evaluation Time (Minutes) 15     "

## 2018-06-18 NOTE — CONSULTS
Care Transition Initial Assessment - RN    Reason For Consult: care coordination/care conference, discharge planning   Met with: Patient and  at bedside, Misty RN by [phone] [phone].   DATA   Active Problems:    Fall       Cognitive Status: awake and alert.  Primary Care Clinic Name: [facility]   Primary Care MD Name: Dr. Manzanares   Contact information and PCP information verified: Yes  Lives With: facility resident  Living Arrangements: other (see comments) ([facility])  Quality Of Family Relationships: supportive, involved  Description of Support System: Supportive, Involved   Who is your support system?:        Insurance concerns: No Insurance issues identified  ASSESSMENT  Patient currently receives the following services:  Pt lives at [facility] in their Memory Care unit. At baseline, she ambulates w/ SBA and 4WW. Both  and staff report that pt's ambulation w/ 4WW is difficult as pt has a left hand contracture and is unable to grasp her walker. The staff at [facility] provides assistance w/ bathing, dressing, toileting and medication management. [facility] is able to provide up to A2 for pt, if needed.     In the past, pt has been open to [facility] for PT/OT, she is not currently open to HC. Misty reports that pt's ability to participate in therapies varies day by day.     Per PT, pt should ambulate w/ CGA at all times and no assistive device for safety. Pt will d/c back to her Memory Care today w/  transporting. Call placed to Misty and  to call back to update, awaiting response.     Update 1320: Spoke w/ Misty at [facility], reviewed PT recommendations and plan for pt to return this afternoon. Pt is able to d/c back to her Memory Care unit w/  transporting, orders faxed.        PLAN  Patient given options and choices for discharge Yes.  Patient/family is agreeable to the plan?   Yes  Patient anticipates discharging to [facility] .     Patient anticipates needs for home equipment: No  Plan/Disposition: Home     Care  (CTS) will continue to follow as needed.    Terri Garcia RN BSN CTS   [phone]  Care Transitions Team  [facility]

## 2018-11-15 ENCOUNTER — OFFICE VISIT (OUTPATIENT)
Dept: CARDIOLOGY | Facility: CLINIC | Age: 73
End: 2018-11-15
Attending: INTERNAL MEDICINE
Payer: COMMERCIAL

## 2018-11-15 VITALS
HEIGHT: 65 IN | HEART RATE: 69 BPM | WEIGHT: 120 LBS | DIASTOLIC BLOOD PRESSURE: 80 MMHG | OXYGEN SATURATION: 97 % | SYSTOLIC BLOOD PRESSURE: 108 MMHG | BODY MASS INDEX: 19.99 KG/M2

## 2018-11-15 DIAGNOSIS — E78.5 HYPERLIPIDEMIA LDL GOAL <70: ICD-10-CM

## 2018-11-15 DIAGNOSIS — I10 BENIGN ESSENTIAL HYPERTENSION: ICD-10-CM

## 2018-11-15 DIAGNOSIS — Z98.61 S/P PTCA (PERCUTANEOUS TRANSLUMINAL CORONARY ANGIOPLASTY): ICD-10-CM

## 2018-11-15 DIAGNOSIS — I25.10 CORONARY ARTERY DISEASE INVOLVING NATIVE CORONARY ARTERY OF NATIVE HEART WITHOUT ANGINA PECTORIS: Primary | ICD-10-CM

## 2018-11-15 PROCEDURE — 99213 OFFICE O/P EST LOW 20 MIN: CPT | Performed by: INTERNAL MEDICINE

## 2018-11-15 RX ORDER — GABAPENTIN 100 MG/1
100 CAPSULE ORAL 3 TIMES DAILY
COMMUNITY

## 2018-11-15 RX ORDER — POLYETHYLENE GLYCOL 3350 17 G/17G
1 POWDER, FOR SOLUTION ORAL DAILY
COMMUNITY

## 2018-11-15 RX ORDER — DOCUSATE SODIUM 100 MG/1
100 CAPSULE, LIQUID FILLED ORAL 2 TIMES DAILY
COMMUNITY

## 2018-11-15 NOTE — PROGRESS NOTES
HPI and Plan:   See dictation:[id number]    No orders of the defined types were placed in this encounter.      Orders Placed This Encounter   Medications     docusate sodium (DOK) 100 MG capsule     Sig: Take 100 mg by mouth 2 times daily     polyethylene glycol (MIRALAX/GLYCOLAX) Packet     Sig: Take 1 packet by mouth daily     gabapentin (NEURONTIN) 100 MG capsule     Sig: Take 100 mg by mouth 3 times daily     magnesium hydroxide (MILK OF MAGNESIA) 400 MG/5ML suspension     Sig: Take by mouth daily as needed for constipation or heartburn     medical cannabis (Patient's own supply.  Not a prescription)     Sig: See Admin Instructions Gema Oral Suspension, Cherry Vanilla, 60ML (take .25 ml by mouth BID)  ((This is NOT a prescription, and does not certify that the patient has a qualifying medical condition for medical cannabis.  The purpose of this order is  to document that the patient reports taking medical cannabis.)       There are no discontinued medications.      Encounter Diagnoses   Name Primary?     Coronary artery disease involving native coronary artery of native heart without angina pectoris Yes     S/P PTCA (percutaneous transluminal coronary angioplasty)      Benign essential hypertension      Hyperlipidemia LDL goal <70        CURRENT MEDICATIONS:  Current Outpatient Prescriptions   Medication Sig Dispense Refill     acetaminophen (TYLENOL) 325 MG tablet Take 650 mg by mouth every 6 hours as needed for mild pain       ARIPiprazole (ABILIFY) 5 MG tablet Take 5 mg by mouth daily       aspirin 81 MG tablet Take 81 mg by mouth daily       Calcium Carbonate-Vitamin D (CALCIUM + D) 600-200 MG-UNIT per tablet Take 1 tablet by mouth daily        cyanocobalamin (VITAMIN B12) 1000 MCG/ML injection Inject 1 mL into the muscle every 30 days       docusate sodium (DOK) 100 MG capsule Take 100 mg by mouth 2 times daily       DULoxetine (CYMBALTA) 60 MG capsule Take 60 mg by mouth every evening at 1700        fluticasone (FLONASE) 50 MCG/ACT nasal spray Spray 2 sprays into both nostrils daily       gabapentin (NEURONTIN) 100 MG capsule Take 100 mg by mouth 3 times daily       guaiFENesin-dextromethorphan (ROBITUSSIN DM) 100-10 MG/5ML syrup Take 10 mLs by mouth every 6 hours as needed for cough       ibuprofen (ADVIL/MOTRIN) 200 MG tablet Take 200 mg by mouth every 4 hours as needed for mild pain       levothyroxine (SYNTHROID/LEVOTHROID) 75 MCG tablet Take 75 mcg by mouth daily       loperamide (IMODIUM) 2 MG capsule Take 4 mg by mouth 2 times daily as needed for diarrhea       magnesium hydroxide (MILK OF MAGNESIA) 400 MG/5ML suspension Take by mouth daily as needed for constipation or heartburn       medical cannabis (Patient's own supply.  Not a prescription) See Admin Instructions Gema Oral Suspension, Cherry Vanilla, 60ML (take .25 ml by mouth BID)  ((This is NOT a prescription, and does not certify that the patient has a qualifying medical condition for medical cannabis.  The purpose of this order is  to document that the patient reports taking medical cannabis.)       metFORMIN (GLUCOPHAGE) 500 MG tablet Take 500 mg by mouth 2 times daily (with meals)       mirtazapine (REMERON) 15 MG tablet Take 15 mg by mouth At Bedtime       Multiple Vitamin (DAILY MULTIVITAMIN PO) Take 1 tablet by mouth daily At noon       nystatin (MYCOSTATIN) cream Apply topically 2 times daily       polyethylene glycol (MIRALAX/GLYCOLAX) Packet Take 1 packet by mouth daily       polyethylene glycol 400 (BLINK TEARS) 0.25 % SOLN ophthalmic solution Place 1 drop into both eyes 3 times daily as needed for dry eyes       propranolol HCl 60 MG TABS Take 1 tablet by mouth daily         ALLERGIES     Allergies   Allergen Reactions     Sulfa Drugs      Synthroid [Levothyroxine]        PAST MEDICAL HISTORY:  Past Medical History:   Diagnosis Date     Coronary artery disease 1/1999    MI, CABG-LIMA to the LAD, saphenous vein graft to the diagonal,  saphenous vein graft to obtuse  marginal 1, and a separate saphenous vein graft to obtuse marginal 3     DM type 2, goal A1C 7-8     quite a long time     HTN (hypertension)      Hypercholesteremia      Hyperlipidemia LDL goal <100      Hypothyroid     takes levothyroxin for quite a few years     LOC (loss of consciousness) (H)     briefly- went through [facility] and had stitches to left top of head     Mild major depression (H)      S/P PTCA (percutaneous transluminal coronary angioplasty) 14    stent first diagonal     Vitamin B 12 deficiency        PAST SURGICAL HISTORY:  Past Surgical History:   Procedure Laterality Date     ANKLE SURGERY       APPENDECTOMY       BYPASS GRAFT ARTERY CORONARY      MI, CABG-LIMA to the LAD, saphenous vein graft to the diagonal, saphenous vein graft to obtuse  marginal 1, and a separate saphenous vein graft to obtuse marginal 3     CHOLECYSTECTOMY       COLONOSCOPY       EYE SURGERY      cataracts- both eyes and implants     HEART CATH, ANGIOPLASTY  2/20/15     patent blood flow in all vessels including grafts.     ORTHOPEDIC SURGERY      left ankle     SINUS SURGERY         FAMILY HISTORY:  Family History   Problem Relation Age of Onset     Diabetes       Depression       Diabetes       C.A.D.       Depression Mother      in and out of IP, mom killed self when she was 16     Anxiety Disorder Mother      Depression Maternal Grandmother      Suicide Maternal Grandmother 40     attempted suicide,  laterIP- possibly from drinking lye     Depression Sister      Anxiety Disorder Sister      Bipolar Disorder Sister      Substance Abuse Sister      Depression Sister      Anxiety Disorder Sister        SOCIAL HISTORY:  Social History     Social History     Marital status:      Spouse name: N/A     Number of children: N/A     Years of education: N/A     Social History Main Topics     Smoking status: Former Smoker     Packs/day: 1.50     Years: 30.00     Types:  "Cigarettes     Quit date: 3/4/1992     Smokeless tobacco: Never Used     Alcohol use 0.6 oz/week     1 Standard drinks or equivalent per week      Comment: Rare drink with dinner, now doesn't     Drug use: No     Sexual activity: Not Currently     Partners: Male     Other Topics Concern     Caffeine Concern No     1 - 2 daily     Sleep Concern Yes     takes Seroquil     Special Diet Yes      low fat      Exercise Yes     Wel program 4 days per week     Social History Narrative       Review of Systems:  Skin:  Negative       Eyes:  Positive for glasses dry eyes once in a while in winter  ENT:  Negative      Respiratory:  Positive for cough     Cardiovascular:    lightheadedness;Positive for    Gastroenterology: Negative      Genitourinary:  Negative      Musculoskeletal:  Positive for foot pain hammer toes   Neurologic:  Positive for headaches    Psychiatric:  Positive for anxiety    Heme/Lymph/Imm:  Positive for easy bruising    Endocrine:  Positive for diabetes;thyroid disorder      Physical Exam:  Vitals: /80 (BP Location: Right arm, Patient Position: Sitting, Cuff Size: Adult Regular)  Pulse 69  Ht 1.651 m (5' 5\")  Wt 54.4 kg (120 lb)  SpO2 97%  BMI 19.97 kg/m2    Constitutional:  cooperative thin;cachectic;frail      Skin:  warm and dry to the touch          Head:    facial abrasions and bruising      Eyes:  pupils equal and round;sclera white;no xanthalasma;conjunctivae and lids unremarkable        Lymph:      ENT:  no pallor or cyanosis        Neck:  carotid pulses are full and equal bilaterally, JVP normal, no carotid bruit        Respiratory:  healed median sternotomy scar;clear to auscultation;normal symmetry;normal respiratory excursion         Cardiac: regular rhythm;normal S1 and S2;apical impulse not displaced   S4   systolic ejection murmur;grade 1;LLSB;radiation to the RUSB        pulses full and equal, no bruits auscultated                                        GI:  abdomen soft, " non-tender, BS normoactive, no mass, no HSM, no bruits        Extremities and Muscular Skeletal:  no edema;no deformities, clubbing, cyanosis, erythema observed              Neurological:    tremor clicks her dentures repeatedly, walks taking very small steps, affect is blunted, answers to questions one word.    Psych:    Anxious      CC  Nate Guzman MD  [address]  [place] [address]

## 2018-11-15 NOTE — LETTER
11/15/2018    Barbara Manzanares MD  [facility] [address] [address]  [facility] [address]    RE: Caitlyn Brower       Dear Colleague,    I had the pleasure of seeing Caitlyn Brower in the [facility].    HPI and Plan:   See dictation:[id number]    No orders of the defined types were placed in this encounter.      Orders Placed This Encounter   Medications     docusate sodium (DOK) 100 MG capsule     Sig: Take 100 mg by mouth 2 times daily     polyethylene glycol (MIRALAX/GLYCOLAX) Packet     Sig: Take 1 packet by mouth daily     gabapentin (NEURONTIN) 100 MG capsule     Sig: Take 100 mg by mouth 3 times daily     magnesium hydroxide (MILK OF MAGNESIA) 400 MG/5ML suspension     Sig: Take by mouth daily as needed for constipation or heartburn     medical cannabis (Patient's own supply.  Not a prescription)     Sig: See Admin Instructions Gema Oral Suspension, Cherry Vanilla, 60ML (take .25 ml by mouth BID)  ((This is NOT a prescription, and does not certify that the patient has a qualifying medical condition for medical cannabis.  The purpose of this order is  to document that the patient reports taking medical cannabis.)       There are no discontinued medications.      Encounter Diagnoses   Name Primary?     Coronary artery disease involving native coronary artery of native heart without angina pectoris Yes     S/P PTCA (percutaneous transluminal coronary angioplasty)      Benign essential hypertension      Hyperlipidemia LDL goal <70        CURRENT MEDICATIONS:  Current Outpatient Prescriptions   Medication Sig Dispense Refill     acetaminophen (TYLENOL) 325 MG tablet Take 650 mg by mouth every 6 hours as needed for mild pain       ARIPiprazole (ABILIFY) 5 MG tablet Take 5 mg by mouth daily       aspirin 81 MG tablet Take 81 mg by mouth daily       Calcium Carbonate-Vitamin D (CALCIUM + D) 600-200 MG-UNIT per tablet Take 1 tablet by mouth daily        cyanocobalamin  (VITAMIN B12) 1000 MCG/ML injection Inject 1 mL into the muscle every 30 days       docusate sodium (DOK) 100 MG capsule Take 100 mg by mouth 2 times daily       DULoxetine (CYMBALTA) 60 MG capsule Take 60 mg by mouth every evening at 1700       fluticasone (FLONASE) 50 MCG/ACT nasal spray Spray 2 sprays into both nostrils daily       gabapentin (NEURONTIN) 100 MG capsule Take 100 mg by mouth 3 times daily       guaiFENesin-dextromethorphan (ROBITUSSIN DM) 100-10 MG/5ML syrup Take 10 mLs by mouth every 6 hours as needed for cough       ibuprofen (ADVIL/MOTRIN) 200 MG tablet Take 200 mg by mouth every 4 hours as needed for mild pain       levothyroxine (SYNTHROID/LEVOTHROID) 75 MCG tablet Take 75 mcg by mouth daily       loperamide (IMODIUM) 2 MG capsule Take 4 mg by mouth 2 times daily as needed for diarrhea       magnesium hydroxide (MILK OF MAGNESIA) 400 MG/5ML suspension Take by mouth daily as needed for constipation or heartburn       medical cannabis (Patient's own supply.  Not a prescription) See Admin Instructions Gema Oral Suspension, Cherry Vanilla, 60ML (take .25 ml by mouth BID)  ((This is NOT a prescription, and does not certify that the patient has a qualifying medical condition for medical cannabis.  The purpose of this order is  to document that the patient reports taking medical cannabis.)       metFORMIN (GLUCOPHAGE) 500 MG tablet Take 500 mg by mouth 2 times daily (with meals)       mirtazapine (REMERON) 15 MG tablet Take 15 mg by mouth At Bedtime       Multiple Vitamin (DAILY MULTIVITAMIN PO) Take 1 tablet by mouth daily At noon       nystatin (MYCOSTATIN) cream Apply topically 2 times daily       polyethylene glycol (MIRALAX/GLYCOLAX) Packet Take 1 packet by mouth daily       polyethylene glycol 400 (BLINK TEARS) 0.25 % SOLN ophthalmic solution Place 1 drop into both eyes 3 times daily as needed for dry eyes       propranolol HCl 60 MG TABS Take 1 tablet by mouth daily         ALLERGIES      Allergies   Allergen Reactions     Sulfa Drugs      Synthroid [Levothyroxine]        PAST MEDICAL HISTORY:  Past Medical History:   Diagnosis Date     Coronary artery disease 1999    MI, CABG-LIMA to the LAD, saphenous vein graft to the diagonal, saphenous vein graft to obtuse  marginal 1, and a separate saphenous vein graft to obtuse marginal 3     DM type 2, goal A1C 7-8     quite a long time     HTN (hypertension)      Hypercholesteremia      Hyperlipidemia LDL goal <100      Hypothyroid     takes levothyroxin for quite a few years     LOC (loss of consciousness) (H)     briefly- went through [facility] and had stitches to left top of head     Mild major depression (H)      S/P PTCA (percutaneous transluminal coronary angioplasty) 14    stent first diagonal     Vitamin B 12 deficiency        PAST SURGICAL HISTORY:  Past Surgical History:   Procedure Laterality Date     ANKLE SURGERY       APPENDECTOMY       BYPASS GRAFT ARTERY CORONARY      MI, CABG-LIMA to the LAD, saphenous vein graft to the diagonal, saphenous vein graft to obtuse  marginal 1, and a separate saphenous vein graft to obtuse marginal 3     CHOLECYSTECTOMY       COLONOSCOPY       EYE SURGERY      cataracts- both eyes and implants     HEART CATH, ANGIOPLASTY  2/20/15     patent blood flow in all vessels including grafts.     ORTHOPEDIC SURGERY      left ankle     SINUS SURGERY         FAMILY HISTORY:  Family History   Problem Relation Age of Onset     Diabetes       Depression       Diabetes       C.A.D.       Depression Mother      in and out of IP, mom killed self when she was 16     Anxiety Disorder Mother      Depression Maternal Grandmother      Suicide Maternal Grandmother 40     attempted suicide,  laterIP- possibly from drinking lye     Depression Sister      Anxiety Disorder Sister      Bipolar Disorder Sister      Substance Abuse Sister      Depression Sister      Anxiety Disorder Sister        SOCIAL HISTORY:  Social  "History     Social History     Marital status:      Spouse name: N/A     Number of children: N/A     Years of education: N/A     Social History Main Topics     Smoking status: Former Smoker     Packs/day: 1.50     Years: 30.00     Types: Cigarettes     Quit date: 3/4/1992     Smokeless tobacco: Never Used     Alcohol use 0.6 oz/week     1 Standard drinks or equivalent per week      Comment: Rare drink with dinner, now doesn't     Drug use: No     Sexual activity: Not Currently     Partners: Male     Other Topics Concern     Caffeine Concern No     1 - 2 daily     Sleep Concern Yes     takes Seroquil     Special Diet Yes      low fat      Exercise Yes     Wel program 4 days per week     Social History Narrative       Review of Systems:  Skin:  Negative       Eyes:  Positive for glasses dry eyes once in a while in winter  ENT:  Negative      Respiratory:  Positive for cough     Cardiovascular:    lightheadedness;Positive for    Gastroenterology: Negative      Genitourinary:  Negative      Musculoskeletal:  Positive for foot pain hammer toes   Neurologic:  Positive for headaches    Psychiatric:  Positive for anxiety    Heme/Lymph/Imm:  Positive for easy bruising    Endocrine:  Positive for diabetes;thyroid disorder      Physical Exam:  Vitals: /80 (BP Location: Right arm, Patient Position: Sitting, Cuff Size: Adult Regular)  Pulse 69  Ht 1.651 m (5' 5\")  Wt 54.4 kg (120 lb)  SpO2 97%  BMI 19.97 kg/m2    Constitutional:  cooperative thin;cachectic;frail      Skin:  warm and dry to the touch          Head:    facial abrasions and bruising      Eyes:  pupils equal and round;sclera white;no xanthalasma;conjunctivae and lids unremarkable        Lymph:      ENT:  no pallor or cyanosis        Neck:  carotid pulses are full and equal bilaterally, JVP normal, no carotid bruit        Respiratory:  healed median sternotomy scar;clear to auscultation;normal symmetry;normal respiratory excursion         Cardiac: " regular rhythm;normal S1 and S2;apical impulse not displaced   S4   systolic ejection murmur;grade 1;LLSB;radiation to the RUSB        pulses full and equal, no bruits auscultated                                        GI:  abdomen soft, non-tender, BS normoactive, no mass, no HSM, no bruits        Extremities and Muscular Skeletal:  no edema;no deformities, clubbing, cyanosis, erythema observed              Neurological:    tremor clicks her dentures repeatedly, walks taking very small steps, affect is blunted, answers to questions one word.    Psych:    Anxious      CC  Nate Guzman MD  [address] [address]                Thank you for allowing me to participate in the care of your patient.      Sincerely,     Nate Guzman MD     [facility]    cc:   Nate Guzman MD  [address] [address]

## 2018-11-15 NOTE — MR AVS SNAPSHOT
"              After Visit Summary   11/15/2018    Caitlyn Brower    MRN: [id number]           Patient Information     Date Of Birth          1945        Visit Information        Provider Department      11/15/2018 3:15 PM Nate Guzman MD [facility]        Today's Diagnoses     Coronary artery disease involving native coronary artery of native heart without angina pectoris    -  1    S/P PTCA (percutaneous transluminal coronary angioplasty)        Benign essential hypertension        Hyperlipidemia LDL goal <70           Follow-ups after your visit        Who to contact     If you have questions or need follow up information about today's clinic visit or your schedule please contact [facility] directly at [phone].  Normal or non-critical lab and imaging results will be communicated to you by [email or web address], letter or phone within 4 business days after the clinic has received the results. If you do not hear from us within 7 days, please contact the clinic through [email or web address] or phone. If you have a critical or abnormal lab result, we will notify you by phone as soon as possible.  Submit refill requests through [employer] or call your pharmacy and they will forward the refill request to us. Please allow 3 business days for your refill to be completed.          Additional Information About Your Visit        MyChart Information     [employer] lets you send messages to your doctor, view your test results, renew your prescriptions, schedule appointments and more. To sign up, go to [email or web address] . Click on \"Log in\" on the left side of the screen, which will take you to the Welcome page. Then click on \"Sign up Now\" on the right side of the page.     You will be asked to enter the access code listed below, as well as some personal information. Please follow the directions to create your username and password.     Your access " "code is: [email or web address]  Expires: 2019  3:44 PM     Your access code will  in 90 days. If you need help or a new code, please call your [facility] clinic or [phone].        Care EveryWhere ID     This is your Care EveryWhere ID. This could be used by other organizations to access your [facility] medical records  [id number]        Your Vitals Were     Pulse Height Pulse Oximetry BMI (Body Mass Index)          69 1.651 m (5' 5\") 97% 19.97 kg/m2         Blood Pressure from Last 3 Encounters:   11/15/18 108/80   18 149/80   18 (!) 191/117    Weight from Last 3 Encounters:   11/15/18 54.4 kg (120 lb)   18 56.8 kg (125 lb 4.8 oz)   18 56.2 kg (124 lb)              We Performed the Following     Follow-Up with Cardiologist        Primary Care Provider Office Phone # Fax #    Barbara Manzanares [phone]       [facility] [address] [facility] [address]        Equal Access to Services     [facility] : Hadii aad ku hadasho [place], waaxda luqadaha, qaybta kaalmada adeegyada, armando campos ah. So [facility] [phone].    ATENCIÓN: Si habla español, tiene a ocampo disposición servicios gratuitos de asistencia lingüística. [facility] [phone].    We comply with applicable federal civil rights laws and [place] laws. We do not discriminate on the basis of race, color, national origin, age, disability, sex, sexual orientation, or gender identity.            Thank you!     Thank you for choosing [facility]  for your care. Our goal is always to provide you with excellent care. Hearing back from our patients is one way we can continue to improve our services. Please take a few minutes to complete the written survey that you may receive in the mail after your visit with us. Thank you!             Your Updated Medication List - Protect others around you: Learn how to safely use, store and " throw away your medicines at www.disposemymeds.org.          This list is accurate as of 11/15/18  3:44 PM.  Always use your most recent med list.                   Brand Name Dispense Instructions for use Diagnosis    ARIPiprazole 5 MG tablet    ABILIFY     Take 5 mg by mouth daily        aspirin 81 MG tablet      Take 81 mg by mouth daily        BLINK TEARS 0.25 % Soln ophthalmic solution   Generic drug:  polyethylene glycol 400      Place 1 drop into both eyes 3 times daily as needed for dry eyes        calcium + D 600-200 MG-UNIT Tabs   Generic drug:  calcium carbonate-vitamin D      Take 1 tablet by mouth daily        cyanocobalamin 1000 MCG/ML injection    VITAMIN B12     Inject 1 mL into the muscle every 30 days        CYMBALTA 60 MG EC capsule   Generic drug:  DULoxetine      Take 60 mg by mouth every evening at 1700        DAILY MULTIVITAMIN PO      Take 1 tablet by mouth daily At noon         MG capsule   Generic drug:  docusate sodium      Take 100 mg by mouth 2 times daily        fluticasone 50 MCG/ACT spray    FLONASE     Spray 2 sprays into both nostrils daily        gabapentin 100 MG capsule    NEURONTIN     Take 100 mg by mouth 3 times daily        guaiFENesin-dextromethorphan 100-10 MG/5ML syrup    ROBITUSSIN DM     Take 10 mLs by mouth every 6 hours as needed for cough        ibuprofen 200 MG tablet    ADVIL/MOTRIN     Take 200 mg by mouth every 4 hours as needed for mild pain        levothyroxine 75 MCG tablet    SYNTHROID/LEVOTHROID     Take 75 mcg by mouth daily        loperamide 2 MG capsule    IMODIUM     Take 4 mg by mouth 2 times daily as needed for diarrhea        medical cannabis (Patient's own supply.  Not a prescription)      See Admin Instructions Gema Oral Suspension, Cherry Vanilla, 60ML (take .25 ml by mouth BID)  ((This is NOT a prescription, and does not certify that the patient has a qualifying medical condition for medical cannabis.  The purpose of this order is  to  document that the patient reports taking medical cannabis.)        metFORMIN 500 MG tablet    GLUCOPHAGE     Take 500 mg by mouth 2 times daily (with meals)        MILK OF MAGNESIA 400 MG/5ML suspension   Generic drug:  magnesium hydroxide      Take by mouth daily as needed for constipation or heartburn        mirtazapine 15 MG tablet    REMERON     Take 15 mg by mouth At Bedtime        nystatin cream    MYCOSTATIN     Apply topically 2 times daily        polyethylene glycol Packet    MIRALAX/GLYCOLAX     Take 1 packet by mouth daily        propranolol HCl 60 MG Tabs      Take 1 tablet by mouth daily        TYLENOL 325 MG tablet   Generic drug:  acetaminophen      Take 650 mg by mouth every 6 hours as needed for mild pain

## 2018-11-15 NOTE — LETTER
11/15/2018      Barbara Manzanares MD  [facility] [address]  [facility] [address]      RE: Caitlyn Brower       Dear Colleague,    I had the pleasure of seeing Caitlyn Brower in the [facility].    Service Date: 11/15/2018      PRIMARY CARE PHYSICIAN:  Dr. Barbara Manzanares.      HISTORY OF PRESENT ILLNESS:  I again had the pleasure of seeing your patient, Caitlyn Brower, at [facility] for evaluation of coronary artery disease and mixed hyperlipidemia.  The patient is status post 4-vessel coronary artery bypass surgery in 01/1999 after suffering a first diagonal branch artery myocardial infarction.  Her bypasses included left internal mammary artery to the LAD, saphenous vein graft to the diagonal with a skip graft to the first obtuse marginal and a separate saphenous vein graft to the obtuse marginal 3.  The patient developed unstable angina on 09/02/2014 and was taken to the [facility] [place] for intervention.  She had multivessel disease with the LIMA to the LAD patent.  Saphenous vein graft to the diagonal 1 had a 95% stenosis at its anastomotic site.  There was a jump graft to the obtuse marginal 1 that was patent.  Obtuse marginal 3 saphenous vein graft was occluded with contrast staining at the graft anastomosis but no antegrade flow.  This vessel was not intervened upon.  The diagonal branch artery anastomotic graft was then treated with a drug-eluting stent.  The patient had more chest discomfort in 02/2015 and coronary angiography showed no change in her anatomy.  She was admitted in 01/2016 with a normal dobutamine stress echo.  The patient has Lewy body dementia and for the most part lives in an assisted living home.  She has a history of anxiety and nervousness and has gone to the [facility] in the past for ECT treatments.  She has been started on medical marijuana with improvement in her tremors  and appetite.  She has lost considerable weight over the last few years.  Her  notes that she eats with a voracious appetite currently.  Her last lipid profile was in 10/2017.  The patient has diabetes mellitus.  She has had 2-3 falls in the last month causing abrasions to her face, nose, chin and ecchymoses around both eyes.  The plan is to move this patient into hospice sometime next week.      PHYSICAL EXAMINATION:  As listed below.      ASSESSMENT:   1.  Caitlyn Jara is a 73-year-old female with known coronary artery disease, status post previous diagonal branch artery myocardial infarction and 4-vessel bypass surgery in .  In 2014 she developed unstable angina and the anastomosis of the saphenous vein graft to diagonal 1 was stented with excellent results.  A dobutamine stress echo was normal in 2006.  She continues on aspirin at 81 mg but no other cardiac medications.  Her blood pressure has been labile and sometimes low.  She has frequent falls and we are avoiding anything that will lower her blood pressure further.  She denies recurrent angina.   2.  Mixed hyperlipidemia.  This has not been rechecked through the [employer] system in 1 year.  She is off all statins and fenofibrate.  These are probably unnecessary if she is entering hospice.      It has been my pleasure to assist in the care of Caitlyn Jara.  Her  was in attendance today and all their questions were answered to their satisfaction.  I will plan on seeing this patient again on a p.r.n. basis.      cc:   [place] [address]         BRUNO GIBSON MD, [facility]             D: 11/15/2018   T: 11/15/2018   MT: RACHEL      Name:     CAITLYN JARA   MRN:      [id number]        Account:      [id number]   :      1945           Service Date: 11/15/2018      Document: [id number]         Outpatient Encounter Prescriptions as of 11/15/2018    Medication Sig Dispense Refill     acetaminophen (TYLENOL) 325 MG tablet Take 650 mg by mouth every 6 hours as needed for mild pain       ARIPiprazole (ABILIFY) 5 MG tablet Take 5 mg by mouth daily       aspirin 81 MG tablet Take 81 mg by mouth daily       Calcium Carbonate-Vitamin D (CALCIUM + D) 600-200 MG-UNIT per tablet Take 1 tablet by mouth daily        cyanocobalamin (VITAMIN B12) 1000 MCG/ML injection Inject 1 mL into the muscle every 30 days       docusate sodium (DOK) 100 MG capsule Take 100 mg by mouth 2 times daily       DULoxetine (CYMBALTA) 60 MG capsule Take 60 mg by mouth every evening at 1700       fluticasone (FLONASE) 50 MCG/ACT nasal spray Spray 2 sprays into both nostrils daily       gabapentin (NEURONTIN) 100 MG capsule Take 100 mg by mouth 3 times daily       guaiFENesin-dextromethorphan (ROBITUSSIN DM) 100-10 MG/5ML syrup Take 10 mLs by mouth every 6 hours as needed for cough       ibuprofen (ADVIL/MOTRIN) 200 MG tablet Take 200 mg by mouth every 4 hours as needed for mild pain       levothyroxine (SYNTHROID/LEVOTHROID) 75 MCG tablet Take 75 mcg by mouth daily       loperamide (IMODIUM) 2 MG capsule Take 4 mg by mouth 2 times daily as needed for diarrhea       magnesium hydroxide (MILK OF MAGNESIA) 400 MG/5ML suspension Take by mouth daily as needed for constipation or heartburn       medical cannabis (Patient's own supply.  Not a prescription) See Admin Instructions Gema Oral Suspension, Cherry Vanilla, 60ML (take .25 ml by mouth BID)  ((This is NOT a prescription, and does not certify that the patient has a qualifying medical condition for medical cannabis.  The purpose of this order is  to document that the patient reports taking medical cannabis.)       metFORMIN (GLUCOPHAGE) 500 MG tablet Take 500 mg by mouth 2 times daily (with meals)       mirtazapine (REMERON) 15 MG tablet Take 15 mg by mouth At Bedtime       Multiple Vitamin (DAILY MULTIVITAMIN PO) Take 1 tablet by mouth daily  At noon       nystatin (MYCOSTATIN) cream Apply topically 2 times daily       polyethylene glycol (MIRALAX/GLYCOLAX) Packet Take 1 packet by mouth daily       polyethylene glycol 400 (BLINK TEARS) 0.25 % SOLN ophthalmic solution Place 1 drop into both eyes 3 times daily as needed for dry eyes       propranolol HCl 60 MG TABS Take 1 tablet by mouth daily       No facility-administered encounter medications on file as of 11/15/2018.        Again, thank you for allowing me to participate in the care of your patient.      Sincerely,    Nate Guzman MD     [facility]

## 2018-11-16 NOTE — PROGRESS NOTES
Service Date: 11/15/2018      PRIMARY CARE PHYSICIAN:  Dr. Barbara Manzanares.      HISTORY OF PRESENT ILLNESS:  I again had the pleasure of seeing your patient, Caitlyn Brower, at [facility] for evaluation of coronary artery disease and mixed hyperlipidemia.  The patient is status post 4-vessel coronary artery bypass surgery in 01/1999 after suffering a first diagonal branch artery myocardial infarction.  Her bypasses included left internal mammary artery to the LAD, saphenous vein graft to the diagonal with a skip graft to the first obtuse marginal and a separate saphenous vein graft to the obtuse marginal 3.  The patient developed unstable angina on 09/02/2014 and was taken to the [facility] [place] for intervention.  She had multivessel disease with the LIMA to the LAD patent.  Saphenous vein graft to the diagonal 1 had a 95% stenosis at its anastomotic site.  There was a jump graft to the obtuse marginal 1 that was patent.  Obtuse marginal 3 saphenous vein graft was occluded with contrast staining at the graft anastomosis but no antegrade flow.  This vessel was not intervened upon.  The diagonal branch artery anastomotic graft was then treated with a drug-eluting stent.  The patient had more chest discomfort in 02/2015 and coronary angiography showed no change in her anatomy.  She was admitted in 01/2016 with a normal dobutamine stress echo.  The patient has Lewy body dementia and for the most part lives in an assisted living home.  She has a history of anxiety and nervousness and has gone to the [facility] in the past for ECT treatments.  She has been started on medical marijuana with improvement in her tremors and appetite.  She has lost considerable weight over the last few years.  Her  notes that she eats with a voracious appetite currently.  Her last lipid profile was in 10/2017.  The patient has diabetes mellitus.  She has had 2-3 falls in the last  month causing abrasions to her face, nose, chin and ecchymoses around both eyes.  The plan is to move this patient into hospice sometime next week.      PHYSICAL EXAMINATION:  As listed below.      ASSESSMENT:   1.  Caitlyn Jara is a 73-year-old female with known coronary artery disease, status post previous diagonal branch artery myocardial infarction and 4-vessel bypass surgery in .  In 2014 she developed unstable angina and the anastomosis of the saphenous vein graft to diagonal 1 was stented with excellent results.  A dobutamine stress echo was normal in 2006.  She continues on aspirin at 81 mg but no other cardiac medications.  Her blood pressure has been labile and sometimes low.  She has frequent falls and we are avoiding anything that will lower her blood pressure further.  She denies recurrent angina.   2.  Mixed hyperlipidemia.  This has not been rechecked through the [employer] system in 1 year.  She is off all statins and fenofibrate.  These are probably unnecessary if she is entering hospice.      It has been my pleasure to assist in the care of Caitlyn Jara.  Her  was in attendance today and all their questions were answered to their satisfaction.  I will plan on seeing this patient again on a p.r.n. basis.      cc:   [place] [address]         BRUNO GIBSON MD, [facility]             D: 11/15/2018   T: 11/15/2018   MT: RACHEL      Name:     CAITLYN JARA   MRN:      [id number]        Account:      [id number]   :      1945           Service Date: 11/15/2018      Document: [id number]